# Patient Record
Sex: FEMALE | Race: WHITE | NOT HISPANIC OR LATINO | Employment: OTHER | URBAN - METROPOLITAN AREA
[De-identification: names, ages, dates, MRNs, and addresses within clinical notes are randomized per-mention and may not be internally consistent; named-entity substitution may affect disease eponyms.]

---

## 2019-10-05 ENCOUNTER — HOSPITAL ENCOUNTER (EMERGENCY)
Facility: HOSPITAL | Age: 44
Discharge: HOME | End: 2019-10-05
Attending: EMERGENCY MEDICINE
Payer: COMMERCIAL

## 2019-10-05 VITALS
HEART RATE: 115 BPM | SYSTOLIC BLOOD PRESSURE: 131 MMHG | RESPIRATION RATE: 18 BRPM | OXYGEN SATURATION: 98 % | DIASTOLIC BLOOD PRESSURE: 83 MMHG | TEMPERATURE: 98.7 F | BODY MASS INDEX: 43.43 KG/M2 | HEIGHT: 64 IN

## 2019-10-05 DIAGNOSIS — S91.209A AVULSION OF TOENAIL OF RIGHT FOOT: Primary | ICD-10-CM

## 2019-10-05 PROCEDURE — 99283 EMERGENCY DEPT VISIT LOW MDM: CPT

## 2019-10-05 PROCEDURE — 99284 EMERGENCY DEPT VISIT MOD MDM: CPT | Performed by: PHYSICIAN ASSISTANT

## 2019-10-05 RX ORDER — NAPROXEN 250 MG/1
250 TABLET ORAL 2 TIMES DAILY WITH MEALS
COMMUNITY

## 2019-10-05 RX ADMIN — Medication 1 APPLICATION: at 13:59

## 2019-10-05 NOTE — ED PROVIDER NOTES
History  Chief Complaint   Patient presents with    Toe Pain     pt presents to ed with left big toe nail injury that happened this am  -falls, -head injury, -bt     57-year-old female with no significant past medical history presents to the emergency department with chief complaint of right great toe injury  Onset of symptoms reported as this morning  Location of symptoms reported as the right great toe  Quality is reported as nail avulsion  Severity is reported as moderate  Associated symptoms:  Denies paralysis paresthesias or weakness to the right lower extremity  Denies right foot pain  Denies right ankle pain  Denies swelling or rash  Modifying factors:  Nothing has been tried for treatment of symptoms  Context:  Patient reports she was at home when she accidentally caught her nail on a piece of furniture that resulted in the nail being pulled up from the right great toe  She denies other injuries  She did not injure the actual toe she reports she only cough and nail which is what caused it to be pulled off of the toe  Denies other injuries  Tetanus is up-to-date  Reviewed past visits via epic:  No prior visits to this emergency department  History provided by:  Parent and significant other   used: No        Prior to Admission Medications   Prescriptions Last Dose Informant Patient Reported? Taking?   naproxen (NAPROSYN) 250 mg tablet Past Week at Unknown time  Yes Yes   Sig: Take 250 mg by mouth 2 (two) times a day with meals      Facility-Administered Medications: None       History reviewed  No pertinent past medical history  Past Surgical History:   Procedure Laterality Date    BLADDER IRRIGATION         History reviewed  No pertinent family history  I have reviewed and agree with the history as documented  Social History     Tobacco Use    Smoking status: Never Smoker    Smokeless tobacco: Never Used   Substance Use Topics    Alcohol use:  Yes Comment: social    Drug use: Not on file        Review of Systems   Constitutional: Negative for activity change, appetite change, chills, diaphoresis, fatigue, fever and unexpected weight change  HENT: Negative for congestion, dental problem, drooling, ear discharge, ear pain, facial swelling, hearing loss, mouth sores, nosebleeds, postnasal drip, rhinorrhea, sinus pressure, sinus pain, sneezing, sore throat, tinnitus, trouble swallowing and voice change  Eyes: Negative for photophobia, pain, discharge, redness, itching and visual disturbance  Respiratory: Negative for apnea, cough, choking, chest tightness, shortness of breath, wheezing and stridor  Cardiovascular: Negative for chest pain, palpitations and leg swelling  Gastrointestinal: Negative for abdominal distention, abdominal pain, anal bleeding, blood in stool, constipation, diarrhea, nausea, rectal pain and vomiting  Endocrine: Negative for cold intolerance, heat intolerance, polydipsia, polyphagia and polyuria  Genitourinary: Negative for decreased urine volume, difficulty urinating, dyspareunia, dysuria, enuresis, flank pain, frequency, hematuria, menstrual problem, pelvic pain, urgency, vaginal bleeding, vaginal discharge and vaginal pain  Musculoskeletal: Negative for arthralgias, back pain, gait problem, joint swelling, myalgias, neck pain and neck stiffness  Skin: Positive for wound  Negative for color change, pallor and rash  Allergic/Immunologic: Negative for environmental allergies, food allergies and immunocompromised state  Neurological: Negative for dizziness, tremors, seizures, syncope, facial asymmetry, speech difficulty, weakness, light-headedness, numbness and headaches  Hematological: Negative for adenopathy  Does not bruise/bleed easily  Psychiatric/Behavioral: Negative for agitation, confusion, hallucinations, self-injury and suicidal ideas  The patient is not hyperactive      All other systems reviewed and are negative  Physical Exam  Physical Exam   Constitutional: She is oriented to person, place, and time  She appears well-developed and well-nourished  No distress  /83   Pulse (!) 115   Temp 98 7 °F (37 1 °C) (Oral)   Resp 18   Ht 5' 4" (1 626 m)   LMP  (LMP Unknown)   SpO2 98%   BMI 43 43 kg/m²    HENT:   Head: Normocephalic and atraumatic  Right Ear: External ear normal    Left Ear: External ear normal    Nose: Nose normal    Mouth/Throat: Oropharynx is clear and moist  No oropharyngeal exudate  Eyes: Pupils are equal, round, and reactive to light  Conjunctivae and EOM are normal  Right eye exhibits no discharge  Left eye exhibits no discharge  No scleral icterus  Neck: Normal range of motion  Neck supple  No JVD present  No tracheal deviation present  Cardiovascular: Normal rate, regular rhythm and intact distal pulses  Pulmonary/Chest: Effort normal and breath sounds normal  No stridor  No respiratory distress  She has no wheezes  She has no rales  She exhibits no tenderness  Abdominal: Soft  Bowel sounds are normal  She exhibits no distension and no mass  There is no tenderness  There is no rebound and no guarding  No hernia  Musculoskeletal: Normal range of motion  She exhibits tenderness  She exhibits no edema or deformity  Right Foot/Ankle exam: abnormal inspection, there is full great toenail avulsion, no gross deformity, SILT to all surfaces, NVI, cap refill less than 3 seconds  Posterior tibial pulse 2/4 normal   Distal foot is normal to inspection, nontender to palpation with FROM  Proximal knee is normal to inspection, nontender to palpation with FROM  Proximal fibula is nontender to palpation  Ankle is nontender to palpation to all surfaces of right ankle     ROM of ankle is intact to flexion, extension  No calcaneus or 5th metatarsal tenderness to palpation  The great toe is non tender to palpation to all surfaces    There is complete avulsion of the great toenail which is still present  No laceration to underlying nailbed  Melendezs Test: squeeze of posterior calf produces plantar flexion of foot  No Achilles tendon tenderness to palpation  Lymphadenopathy:     She has no cervical adenopathy  Neurological: She is alert and oriented to person, place, and time  She displays normal reflexes  No cranial nerve deficit or sensory deficit  She exhibits normal muscle tone  Coordination normal    Skin: Skin is warm and dry  Capillary refill takes less than 2 seconds  No rash noted  She is not diaphoretic  No erythema  No pallor  Psychiatric: She has a normal mood and affect  Her behavior is normal  Judgment and thought content normal    Nursing note and vitals reviewed  Vital Signs  ED Triage Vitals [10/05/19 1149]   Temperature Pulse Respirations Blood Pressure SpO2   98 7 °F (37 1 °C) (!) 115 18 131/83 98 %      Temp Source Heart Rate Source Patient Position - Orthostatic VS BP Location FiO2 (%)   Oral Monitor -- -- --      Pain Score       7           Vitals:    10/05/19 1149   BP: 131/83   Pulse: (!) 115         Visual Acuity      ED Medications  Medications   LET gel 1 application (1 application Topical Given 10/5/19 1359)       Diagnostic Studies  Results Reviewed     None                 No orders to display              Procedures  Procedures       ED Course                               MDM  Number of Diagnoses or Management Options  Avulsion of toenail of right foot: new and does not require workup  Diagnosis management comments: ddx includes but is not limited to nail avulsion, nailbed injury, infection, laceration, toe injury  Patient with no laceration to toe on exploration  Nailbed without laceration on exam   Wound explored to depth  Discussed with patient, native nail is best nailbed cover  Let applied to cuticle to assist with pain control    Nail was manually reduced and secured in place with steristrips, xeroform dressing, and gauze wrap  Patient tolerated replacement of nail well  Discussed care of replaced nail with patient  Discussed expected course of nail injury including the fact that the nail will likely appear deformed as it starts to grow out  No sign of infection  No indication for antibiotic therapy  Discussed rest, elevation and limit weight bearing on toe for the next few days  Discussed maintain dressing to toe and wear toes with wide toebox  Discussed all test results with patient, including abnormal results  Discussed with patient symptoms most consistent with: right great toenail avulsion  Discussed treatment plan and expected course with patient including: replacement of nail, continue dressing, limited weight bearing  Discussed follow-up with primary care physician in 3-5 days for recheck and further evaluation of symptoms  Discussed follow up with specialist including podiatry in 3-5 days for further evaluation of symptoms  Reviewed reasons to return to ed  Patient verbalized understanding of diagnosis and agreement with discharge plan of care as well as understanding of reasons to return to ed  Amount and/or Complexity of Data Reviewed  Obtain history from someone other than the patient: yes (spouse)  Review and summarize past medical records: yes    Patient Progress  Patient progress: stable      Disposition  Final diagnoses:   Avulsion of toenail of right foot     Time reflects when diagnosis was documented in both MDM as applicable and the Disposition within this note     Time User Action Codes Description Comment    10/5/2019  2:54 PM Winifred Johnson Add [[id number]] Avulsion of toenail of right foot       ED Disposition     ED Disposition Condition Date/Time Comment    Discharge Stable Sat Oct 5, 2019  2:54 PM Celina Damian discharge to home/self care              Follow-up Information     Follow up With Specialties Details Why Contact Info Additional Information    Gold Chou MD Internal Medicine, Palliative Care Call in 1 day for further evaluation of symptoms [address] [place], [address] [facility] [place] [address] Emergency Department Emergency Medicine Go to  If symptoms worsen [address] [facility] [address] ED, [address] [place], [place], [facility], [facility] Podiatry Call in 1 day for further evaluation of symptoms [address]  [place] [address]  [phone]             Discharge Medication List as of 10/5/2019  2:57 PM      CONTINUE these medications which have NOT CHANGED    Details   naproxen (NAPROSYN) 250 mg tablet Take 250 mg by mouth 2 (two) times a day with meals, Historical Med           No discharge procedures on file      ED Provider  Electronically Signed by           Keyana Shin PA-C  10/05/19 0932

## 2019-10-05 NOTE — DISCHARGE INSTRUCTIONS
Treat with dressing as demonstrated in emergency department - steri strip to nail, followed by xeroform dressing (yellow) followed by gauze wrap (white)  Follow up with podiatry in 3-5 days for recheck and further treatment  Rest, ice, elevate foot

## 2023-04-24 ENCOUNTER — OFFICE VISIT (OUTPATIENT)
Dept: PHYSICAL THERAPY | Facility: CLINIC | Age: 48
End: 2023-04-24

## 2023-04-24 DIAGNOSIS — M25.561 RIGHT KNEE PAIN, UNSPECIFIED CHRONICITY: ICD-10-CM

## 2023-04-24 DIAGNOSIS — M25.562 LEFT KNEE PAIN, UNSPECIFIED CHRONICITY: Primary | ICD-10-CM

## 2023-04-26 ENCOUNTER — APPOINTMENT (OUTPATIENT)
Dept: PHYSICAL THERAPY | Facility: CLINIC | Age: 48
End: 2023-04-26

## 2023-05-01 ENCOUNTER — OFFICE VISIT (OUTPATIENT)
Dept: PHYSICAL THERAPY | Facility: CLINIC | Age: 48
End: 2023-05-01

## 2023-05-01 DIAGNOSIS — M25.561 RIGHT KNEE PAIN, UNSPECIFIED CHRONICITY: ICD-10-CM

## 2023-05-01 DIAGNOSIS — M25.562 LEFT KNEE PAIN, UNSPECIFIED CHRONICITY: Primary | ICD-10-CM

## 2023-05-01 NOTE — PROGRESS NOTES
"Daily Note     Today's date: 2023  Patient name: Santosh Gray  : 1975  MRN: [id number]  Referring provider: Matthew Kapadia MD  Dx:   Encounter Diagnosis     ICD-10-CM    1  Left knee pain, unspecified chronicity  M25 562       2  Right knee pain, unspecified chronicity  M25 561                      Subjective: Pt reports that she seems to be able to tolerate longer periods of standing/walking since starting skilled PT tx  However, she reports still having a large amount of pain w/ initial transition from sit --> stand and ambulation after sitting for extended periods of time  Objective: See treatment diary below      Assessment: Progressed ex this visit as below  Pt initially w/ difficulty properly coordinating lateral step ups- improved technique w/ decrease in step height and cueing to drive through heel with step ups  Remainder of ex tolerated well  Plan: Continue per plan of care        Daily Treatment Diary    EPOC: 23  Precautions: Recently dx'd w/ undifferentiated connective tissue disorder  Co- Morbidities: Recently dx'd w/ undifferentiated connective tissue disorder      Manual                B/L knee/patellar PROM    RB  RB  SK RB                                                                                                              Exercise Diary   5         THEREX           Pt ed 8' HEP instruct/handout           Recumbent Bike    5' 5'  5' 6'         Gastrocs Stretch    30\"x3 ea b/l   30\"x3 ea b/l  30\"x3 ea b/l   30\"x3 ea b/l          HR/TR   15x/15x  15x/15x 15x/15x  15x/15x         Active HS stretch w/ ankle pumps  10 pumps x 2 ea b/l  10 pumps x2 ea b/l  10 pumps x2 ea b/l       Heel Slides  10\"x10 ea b/l  10\"x10 ea b/l  10\"x10 ea b/l                              NEURO RE-ED           Quad sets w/ towel roll under knee    10\"x10 ea b/l  10\"x10 ea b/l  10\"x10 ea b/l           SLR flex/abd   Flex: 2x15 b/l; Abd: R " "2x15 and L 15x    Flex 2x15 ea           Minisquats                   Lat step ups     4\"x10 ea b/l       TB resisted hip strengthening     YTB 10x ea b/l flex, abd      TB resisted sidestepping                      THERAPEUTIC ACTIVITY           Step ups           STS  10x  10x 15x                                                 Gait Training                                                           Modalities 4/12 4/17 4/24  5/1             CP PRN Home PRB  10'  10'  10'                                                                     "

## 2023-05-03 ENCOUNTER — OFFICE VISIT (OUTPATIENT)
Dept: PHYSICAL THERAPY | Facility: CLINIC | Age: 48
End: 2023-05-03

## 2023-05-03 DIAGNOSIS — M25.562 LEFT KNEE PAIN, UNSPECIFIED CHRONICITY: Primary | ICD-10-CM

## 2023-05-03 DIAGNOSIS — M25.561 RIGHT KNEE PAIN, UNSPECIFIED CHRONICITY: ICD-10-CM

## 2023-05-03 NOTE — PROGRESS NOTES
"Daily Note     Today's date: 5/3/2023  Patient name: Army Peñaloza  : 1975  MRN: [id number]  Referring provider: Juan Machado MD  Dx:   Encounter Diagnosis     ICD-10-CM    1  Left knee pain, unspecified chronicity  M25 562       2  Right knee pain, unspecified chronicity  M25 561                      Subjective: Pt reports that her knees are doing ok today      Objective: See treatment diary below      Assessment: Progressed ex this visit to include TB resisted sidestepping and fwd step ups  Pt tolerated well- increased cueing required for proper technique w/ step ups to drive heel into step to engage glutes but improved w/ practice  Plan: Continue per plan of care        Daily Treatment Diary    EPOC: 23  Precautions: Recently dx'd w/ undifferentiated connective tissue disorder  Co- Morbidities: Recently dx'd w/ undifferentiated connective tissue disorder      Manual     5/3           B/L knee/patellar PROM    RB  RB  SK RB  RB           k-tape patellar support           RB                                                                                     Exercise Diary  53       THEREX           Pt ed 8' HEP instruct/handout           Recumbent Bike    5' 5'  5' 6' 6'        Gastrocs Stretch    30\"x3 ea b/l   30\"x3 ea b/l  30\"x3 ea b/l   30\"x3 ea b/l   30\"x3 ea b/l        HR/TR   15x/15x  15x/15x 15x/15x  15x/15x  15x/15x       Active HS stretch w/ ankle pumps  10 pumps x 2 ea b/l  10 pumps x2 ea b/l  10 pumps x2 ea b/l  10 pumps x 3 ea b/l      Heel Slides  10\"x10 ea b/l  10\"x10 ea b/l  10\"x10 ea b/l                              NEURO RE-ED           Quad sets w/ towel roll under knee    10\"x10 ea b/l  10\"x10 ea b/l  10\"x10 ea b/l           SLR flex/abd   Flex: 2x15 b/l; Abd: R 2x15 and L 15x    Flex 2x15 ea           Minisquats                   Lat step ups     4\"x10 ea b/l  4\"x15 ea b/l      TB resisted hip strengthening     YTB " "10x ea b/l flex, abd YTB 15x ea b/l flex/abd     TB resisted sidestepping      YTB 2 laps @ bar ea dir b/l                 THERAPEUTIC ACTIVITY           Step ups      4\" x 10 ea b/l w/ drive through heel     STS  10x  10x 15x                                                 Gait Training                                                           Modalities 4/12 4/17 4/24 5/1 5/3            CP PRN Home PRB  10'  10'  10' 10'                                                                   "

## 2023-05-08 ENCOUNTER — OFFICE VISIT (OUTPATIENT)
Dept: PHYSICAL THERAPY | Facility: CLINIC | Age: 48
End: 2023-05-08

## 2023-05-08 DIAGNOSIS — M25.561 RIGHT KNEE PAIN, UNSPECIFIED CHRONICITY: ICD-10-CM

## 2023-05-08 DIAGNOSIS — M25.562 LEFT KNEE PAIN, UNSPECIFIED CHRONICITY: Primary | ICD-10-CM

## 2023-05-08 NOTE — PROGRESS NOTES
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 w/o much increase in pain  She adds that the k-tape seemed to help improve symptoms  Objective: See treatment diary below      Assessment: Pt w/ much better technique/form today w/ step ups and required less cueing to perform properly  Although less cueing, pt is more challenged w/ LLE stepping up  Progressed STS for pt to include 5# weight this visit  Overall tx tolerated well  Plan: Continue per plan of care  Progress as able NV       Daily Treatment Diary    EPOC: 23  Precautions: Recently dx'd w/ undifferentiated connective tissue disorder  Co- Morbidities: Recently dx'd w/ undifferentiated connective tissue disorder      Manual  4/12  4/17  4/19  4/24 5/1   5/3  5/8         B/L knee/patellar PROM    RB  RB  SK RB  RB  RB         k-tape patellar support           RB  RB                                                                                   Exercise Diary 4/12  4//17  4/19  4/24  5/1 5/3  5/8     THEREX           Pt ed 8' HEP instruct/handout           Recumbent Bike    5' 5'  5' 6' 6'   6'     Gastrocs Stretch    30\"x3 ea b/l   30\"x3 ea b/l  30\"x3 ea b/l   30\"x3 ea b/l   30\"x3 ea b/l   30\"x3 ea b/l      HR/TR   15x/15x  15x/15x 15x/15x  15x/15x  15x/15x Held- foot pain     Active HS stretch w/ ankle pumps  10 pumps x 2 ea b/l  10 pumps x2 ea b/l  10 pumps x2 ea b/l  10 pumps x 3 ea b/l      Heel Slides  10\"x10 ea b/l  10\"x10 ea b/l  10\"x10 ea b/l                              NEURO RE-ED           Quad sets w/ towel roll under knee    10\"x10 ea b/l  10\"x10 ea b/l  10\"x10 ea " "b/l           SLR flex/abd   Flex: 2x15 b/l; Abd: R 2x15 and L 15x    Flex 2x15 ea           Minisquats                   Lat step ups     4\"x10 ea b/l  4\"x15 ea b/l  4\" x 15x ea b/l     TB resisted hip strengthening     YTB 10x ea b/l flex, abd YTB 15x ea b/l flex/abd YTB 15x ea b/l flex/abd/ext    TB resisted sidestepping      YTB 2 laps @ bar ea dir b/l  YTB 2 laps @ bar ea dir b/l                THERAPEUTIC ACTIVITY           Step ups      4\" x 10 ea b/l w/ drive through heel 4\" x10 ea b/l w/ drive through heel    STS  10x  10x 15x  5# 15x    Box lifts        NV                                    Gait Training                                                           Modalities 4/12 4/17 4/24 5/1 5/3  5/8         CP PRN Home PRB  10'  10'  10' 10'  10'                                                                 "

## 2023-05-10 ENCOUNTER — OFFICE VISIT (OUTPATIENT)
Dept: PHYSICAL THERAPY | Facility: CLINIC | Age: 48
End: 2023-05-10

## 2023-05-10 DIAGNOSIS — M25.562 LEFT KNEE PAIN, UNSPECIFIED CHRONICITY: ICD-10-CM

## 2023-05-10 DIAGNOSIS — M25.561 RIGHT KNEE PAIN, UNSPECIFIED CHRONICITY: Primary | ICD-10-CM

## 2023-05-10 NOTE — PROGRESS NOTES
"Daily Note     Today's date: 5/10/2023  Patient name: Kvng Laurent  : 1975  MRN: [id number]  Referring provider: Marcela Carmona MD  Dx:   Encounter Diagnosis     ICD-10-CM    1  Right knee pain, unspecified chronicity  M25 561       2  Left knee pain, unspecified chronicity  M25 562           Start Time: 0800          Subjective: pt reports her knee feel tight today  Objective: See treatment diary below      Assessment: Tolerated treatment well  Patient would benefit from continued PT  Started to work on eccentric step down w/ good tolerance, cueing for proper form and c/o pain in L knee  Plan: Continue per plan of care        Daily Treatment Diary    EPOC: 23  Precautions: Recently dx'd w/ undifferentiated connective tissue disorder  Co- Morbidities: Recently dx'd w/ undifferentiated connective tissue disorder      Manual  4/12  4/17  4/19  4/24 5/1   5/3  5/8  5/10       B/L knee/patellar PROM    RB  RB  SK RB  RB  RB  JH       k-tape patellar support           RB  RB                                                                                   Exercise Diary 4/12  4//17  4/19  4/24  5/1 5/3  5/8  5/10   THEREX           Pt ed 8' HEP instruct/handout           Recumbent Bike    5' 5'  5' 6' 6'   6'  6'   Gastrocs Stretch    30\"x3 ea b/l   30\"x3 ea b/l  30\"x3 ea b/l   30\"x3 ea b/l   30\"x3 ea b/l   30\"x3 ea b/l   30\"x3 ea b/l    HR/TR   15x/15x  15x/15x 15x/15x  15x/15x  15x/15x Held- foot pain     Active HS stretch w/ ankle pumps  10 pumps x 2 ea b/l  10 pumps x2 ea b/l  10 pumps x2 ea b/l  10 pumps x 3 ea b/l      Heel Slides  10\"x10 ea b/l  10\"x10 ea b/l  10\"x10 ea b/l                              NEURO RE-ED           Quad sets w/ towel roll under knee    10\"x10 ea b/l  10\"x10 ea b/l  10\"x10 ea b/l           SLR flex/abd   Flex: 2x15 b/l; Abd: R 2x15 and L 15x    Flex 2x15 ea           Minisquats                   Lat step ups     4\"x10 ea b/l  4\"x15 ea b/l  4\" x 15x ea b/l  4\" x " "15x ea b/l    TB resisted hip strengthening     YTB 10x ea b/l flex, abd YTB 15x ea b/l flex/abd YTB 15x ea b/l flex/abd/ext YTB 15x ea b/l flex/abd/ext   TB resisted sidestepping      YTB 2 laps @ bar ea dir b/l  YTB 2 laps @ bar ea dir b/l  YTB 2 laps @ bar ea dir b/l   Step downs        4\" 10x   THERAPEUTIC ACTIVITY           Step ups      4\" x 10 ea b/l w/ drive through heel 4\" x10 ea b/l w/ drive through heel 6\" x10 b/l w/ drive through heel    STS  10x  10x 15x  5# 15x 5# 15x   Box lifts        NV                                    Gait Training                                                           Modalities 4/12 4/17 4/24 5/1 5/3  5/8         CP PRN Home PRB  10'  10'  10' 10'  10'                                                                   "

## 2023-05-17 ENCOUNTER — OFFICE VISIT (OUTPATIENT)
Dept: PHYSICAL THERAPY | Facility: CLINIC | Age: 48
End: 2023-05-17

## 2023-05-17 DIAGNOSIS — M25.561 RIGHT KNEE PAIN, UNSPECIFIED CHRONICITY: Primary | ICD-10-CM

## 2023-05-17 DIAGNOSIS — M25.562 LEFT KNEE PAIN, UNSPECIFIED CHRONICITY: ICD-10-CM

## 2023-05-17 NOTE — PROGRESS NOTES
"Daily Note     Today's date: 2023  Patient name: Tarun Hackett  : 1975  MRN: [id number]  Referring provider: Mikayla Solitario MD  Dx:   Encounter Diagnosis     ICD-10-CM    1  Right knee pain, unspecified chronicity  M25 561       2  Left knee pain, unspecified chronicity  M25 562                      Subjective: Pt reports that her knees were more sore yesterday due to needing to \"sit quite a bit\" for work  She reports trying to take standing breaks  However, overall pt reports that walking around/standing has been significantly improved regarding knees  She is in more pain w/ b/l feet today  Objective: See treatment diary below      Assessment: Held step ups/downs this visit due to pt feeling more pain in feet today  However, added box lifts from low mat and floor today- pt able to perform w/ correct form after initial cueing and practice  No increased pain w/ box lifts  Remainder of ex tolerated well  Plan: Continue per plan of care         Daily Treatment Diary    EPOC: 23  Precautions: Recently dx'd w/ undifferentiated connective tissue disorder  Co- Morbidities: Recently dx'd w/ undifferentiated connective tissue disorder      Manual  4/12  4/17  4/19  4/24 5/1   5/3  5/8  5/10  5/17     B/L knee/patellar PROM    RB  RB  SK RB  RB  RB  JH  RB     k-tape patellar support           RB  RB    RB                                                                               Exercise Diary  4/24  5/1 5/3  5/8  5/10 5/17      THEREX            Pt ed             Recumbent Bike  5' 6' 6'   6'  6' 6'      Gastrocs Stretch 30\"x3 ea b/l   30\"x3 ea b/l   30\"x3 ea b/l   30\"x3 ea b/l   30\"x3 ea b/l  30\"x3 ea b/l       HR/TR 15x/15x  15x/15x  15x/15x Held- foot pain         Active HS stretch w/ ankle pumps 10 pumps x2 ea b/l  10 pumps x 3 ea b/l    10 pumps x 3 ea b/l       Heel Slides 10\"x10 ea b/l                                    NEURO RE-ED            Quad sets w/ towel roll under knee 10\"x10 " "ea b/l               SLR flex/abd  Flex 2x15 ea               Minisquats                 Lat step ups  4\"x10 ea b/l  4\"x15 ea b/l  4\" x 15x ea b/l  4\" x 15x ea b/l        TB resisted hip strengthening  YTB 10x ea b/l flex, abd YTB 15x ea b/l flex/abd YTB 15x ea b/l flex/abd/ext YTB 15x ea b/l flex/abd/ext YTB 15x ea b/l flex/abd/ext      TB resisted sidestepping   YTB 2 laps @ bar ea dir b/l  YTB 2 laps @ bar ea dir b/l  YTB 2 laps @ bar ea dir b/l YTB 2 laps @ bar ea dir b/l       Step downs     4\" 10x       THERAPEUTIC ACTIVITY            Step ups   4\" x 10 ea b/l w/ drive through heel 4\" x10 ea b/l w/ drive through heel 6\" x10 b/l w/ drive through heel        STS 10x 15x  5# 15x 5# 15x 10# 15x      Box lifts     NV  16# 5x from low mat; 10 x from floor                                     Gait Training                                                     Modalities 4/12  4/17  4/24  5/1 5/3  5/8  5/17       CP PRN Home PRB  10'  10'  10' 10'  10'  10'                                                                 "

## 2023-05-19 ENCOUNTER — OFFICE VISIT (OUTPATIENT)
Dept: PHYSICAL THERAPY | Facility: CLINIC | Age: 48
End: 2023-05-19

## 2023-05-19 DIAGNOSIS — M25.561 RIGHT KNEE PAIN, UNSPECIFIED CHRONICITY: Primary | ICD-10-CM

## 2023-05-19 DIAGNOSIS — M25.562 LEFT KNEE PAIN, UNSPECIFIED CHRONICITY: ICD-10-CM

## 2023-05-19 NOTE — PROGRESS NOTES
"Daily Note     Today's date: 2023  Patient name: Raghavendra Parker  : 1975  MRN: [id number]  Referring provider: Jessica Larios MD  Dx:   Encounter Diagnosis     ICD-10-CM    1  Right knee pain, unspecified chronicity  M25 561       2  Left knee pain, unspecified chronicity  M25 562           Start Time: 0850          Subjective: pt reports standing and walking is improving  Stated that negotiating stairs is still problematic  Objective: See treatment diary below      Assessment: Tolerated treatment well  Patient would benefit from continued PT  Taped knee to help support patella more  Focused mostly on functional lifting/carrying w/ good tolerance  Cueing for proper lifting techniques  Plan: Continue per plan of care        Daily Treatment Diary    EPOC: 23  Precautions: Recently dx'd w/ undifferentiated connective tissue disorder  Co- Morbidities: Recently dx'd w/ undifferentiated connective tissue disorder      Manual  4/12  4/17  4/19  4/24 5/1   5/3  5/8  5/10  5/17  5/19   B/L knee/patellar PROM    RB  RB  SK RB  RB  RB  [facility]   k-tape patellar support           RB  RB    [facility]                                                                             Exercise Diary   5/3  5/8  5/10 5/17 5/19     THEREX            Pt ed             Recumbent Bike  5' 6' 6'   6'  6' 6' 6'     Gastrocs Stretch 30\"x3 ea b/l   30\"x3 ea b/l   30\"x3 ea b/l   30\"x3 ea b/l   30\"x3 ea b/l  30\"x3 ea b/l  30\"x3 ea b/l      HR/TR 15x/15x  15x/15x  15x/15x Held- foot pain         Active HS stretch w/ ankle pumps 10 pumps x2 ea b/l  10 pumps x 3 ea b/l    10 pumps x 3 ea b/l       Heel Slides 10\"x10 ea b/l                                    NEURO RE-ED            Quad sets w/ towel roll under knee 10\"x10 ea b/l               SLR flex/abd  Flex 2x15 ea               Minisquats                 Lat step ups  4\"x10 ea b/l  4\"x15 ea b/l  4\" x 15x ea b/l  4\" x 15x ea b/l        TB resisted hip " "strengthening  YTB 10x ea b/l flex, abd YTB 15x ea b/l flex/abd YTB 15x ea b/l flex/abd/ext YTB 15x ea b/l flex/abd/ext YTB 15x ea b/l flex/abd/ext      TB resisted sidestepping   YTB 2 laps @ bar ea dir b/l  YTB 2 laps @ bar ea dir b/l  YTB 2 laps @ bar ea dir b/l YTB 2 laps @ bar ea dir b/l       Step downs     4\" 10x       THERAPEUTIC ACTIVITY            Step ups   4\" x 10 ea b/l w/ drive through heel 4\" x10 ea b/l w/ drive through heel 6\" x10 b/l w/ drive through heel        STS 10x 15x  5# 15x 5# 15x 10# 15x 10# 20x     Box lifts     NV  16# 5x from low mat; 10 x from floor 16# 5x from low mat; 10 x from floor     Box carry       3 laps      TRX squats to 12\" box            12\" + 4\" 10x      Gait Training                                                     Modalities 4/12 4/17 4/24 5/1 5/3  5/8  5/17       CP PRN Home PRB  10'  10'  10' 10'  10'  10'                                                                   "

## 2023-05-22 ENCOUNTER — OFFICE VISIT (OUTPATIENT)
Dept: PHYSICAL THERAPY | Facility: CLINIC | Age: 48
End: 2023-05-22

## 2023-05-22 DIAGNOSIS — M25.561 RIGHT KNEE PAIN, UNSPECIFIED CHRONICITY: Primary | ICD-10-CM

## 2023-05-22 DIAGNOSIS — M25.562 LEFT KNEE PAIN, UNSPECIFIED CHRONICITY: ICD-10-CM

## 2023-05-22 NOTE — PROGRESS NOTES
"Daily Note     Today's date: 2023  Patient name: Nilda Pond  : 1975  MRN: [id number]  Referring provider: Jose Lock MD  Dx:   Encounter Diagnosis     ICD-10-CM    1  Right knee pain, unspecified chronicity  M25 561       2  Left knee pain, unspecified chronicity  M25 562                      Subjective: pt reports having some pain camping especially when stepping over uneven terrain, but states that pain was not severe and did not prevent her from doing what she needed that day and the following day  Objective: See treatment diary below      Assessment: Pt w/ some difficulty engaging L quadriceps musculature w/ step downs/taps initially, but seemed to better engage when performing TRX squat w/ WS to L and L mini lunge  Remainder of ex tolerated fairly well  Improved pain level and tissue tone in L after TPR to distal ITB  Plan: Continue per plan of care  Progress as able NV        Daily Treatment Diary    EPOC: 23  Precautions: Recently dx'd w/ undifferentiated connective tissue disorder  Co- Morbidities: Recently dx'd w/ undifferentiated connective tissue disorder      Manual  4/24 5/1   5/3  5/8  5/10  5/17  5/19 5/22     B/L knee/patellar PROM  SK RB  RB  RB  JH  RB  JH RB     k-tape patellar support     RB  RB    RB  JH       TPR to distal ITB-L               RB                                                 Exercise Diary  4/24  5/1 5/3  5/8  5/10 5/17 5/19 5/22    THEREX            Pt ed             Recumbent Bike  5' 6' 6'   6'  6' 6' 6' 6'    Gastrocs Stretch 30\"x3 ea b/l   30\"x3 ea b/l   30\"x3 ea b/l   30\"x3 ea b/l   30\"x3 ea b/l  30\"x3 ea b/l  30\"x3 ea b/l  30\"x3 ea b/l     HR/TR 15x/15x  15x/15x  15x/15x Held- foot pain         Active HS stretch w/ ankle pumps 10 pumps x2 ea b/l  10 pumps x 3 ea b/l    10 pumps x 3 ea b/l   10 pumps x 3 ea b/l     Heel Slides 10\"x10 ea b/l                                    NEURO RE-ED            Quad sets w/ towel roll under knee " "10\"x10 ea b/l               SLR flex/abd  Flex 2x15 ea               Minisquats             TRX w/ shift to L 10\"x10    Mini Lunge        5\" x10 ea b/l     Lat step ups  4\"x10 ea b/l  4\"x15 ea b/l  4\" x 15x ea b/l  4\" x 15x ea b/l    R 6\" 15x/L 4\" 15x    TB resisted hip strengthening  YTB 10x ea b/l flex, abd YTB 15x ea b/l flex/abd YTB 15x ea b/l flex/abd/ext YTB 15x ea b/l flex/abd/ext YTB 15x ea b/l flex/abd/ext  YTB 15x ea bl/ flex/abd/ext    TB resisted sidestepping   YTB 2 laps @ bar ea dir b/l  YTB 2 laps @ bar ea dir b/l  YTB 2 laps @ bar ea dir b/l YTB 2 laps @ bar ea dir b/l   YTB 2 laps @ bar ea dir b/l     Step downs     4\" 10x   4\" *pain; also attemtped L taps *pain    THERAPEUTIC ACTIVITY            Step ups   4\" x 10 ea b/l w/ drive through heel 4\" x10 ea b/l w/ drive through heel 6\" x10 b/l w/ drive through heel        STS 10x 15x  5# 15x 5# 15x 10# 15x 10# 20x     Box lifts     NV  16# 5x from low mat; 10 x from floor 16# 5x from low mat; 10 x from floor     Box carry       3 laps      TRX squats to 12\" box            12\" + 4\" 10x      Gait Training                                                     Modalities 4/12 4/17 4/24 5/1 5/3  5/8 5/17       CP PRN Home PRB  10'  10'  10' 10'  10'  10'                                                                   "

## 2023-05-24 ENCOUNTER — OFFICE VISIT (OUTPATIENT)
Dept: PHYSICAL THERAPY | Facility: CLINIC | Age: 48
End: 2023-05-24

## 2023-05-24 DIAGNOSIS — M25.561 RIGHT KNEE PAIN, UNSPECIFIED CHRONICITY: Primary | ICD-10-CM

## 2023-05-24 DIAGNOSIS — M25.562 LEFT KNEE PAIN, UNSPECIFIED CHRONICITY: ICD-10-CM

## 2023-05-24 NOTE — PROGRESS NOTES
"Daily Note     Today's date: 2023  Patient name: Loki Jha  : 1975  MRN: [id number]  Referring provider: Lynn Calvillo MD  Dx:   Encounter Diagnosis     ICD-10-CM    1  Right knee pain, unspecified chronicity  M25 561       2  Left knee pain, unspecified chronicity  M25 562                      Subjective: pt states that L lateral knee has been more painful lately  - states that she has had some pain sleeping  Discussed attempting to sleep w/ pillow support b/w knees  Objective: See treatment diary below      Assessment: Performed manuals first f/b stretching, but pt still w/ pain during LLE step ups  Plan: Continue per plan of care    Concluded w/ CP w/ good tolerance     Daily Treatment Diary    EPOC: 23  Precautions: Recently dx'd w/ undifferentiated connective tissue disorder  Co- Morbidities: Recently dx'd w/ undifferentiated connective tissue disorder      Manual  5/8  5/10  5/17  5/19 5/22 5/24    B/L knee/patellar PROM  RB  JH  RB  JH RB RB    k-tape patellar support  RB    RB  JH       TPR to distal ITB-L         RB RB                                    Exercise Diary  5/8  5/10 5/17 5/19 5/22 5/24   THEREX         Pt ed         Recumbent Bike  6'  6' 6' 6' 6' 6'   Gastrocs Stretch  30\"x3 ea b/l   30\"x3 ea b/l  30\"x3 ea b/l  30\"x3 ea b/l  30\"x3 ea b/l     HR/TR Held- foot pain         Active HS stretch w/ ankle pumps   10 pumps x 3 ea b/l   10 pumps x 3 ea b/l  10 pumpsx3 ea b/l    Heel Slides         Supine ITB stretch      30\"x3 ea b/l ; 30\"x3 L off table   Standing ITB stretch      30\"x3   NEURO RE-ED         Quad sets w/ towel roll under knee           SLR flex/abd           Minisquats       TRX w/ shift to L 10\"x10    Mini Lunge     5\" x10 ea b/l     Lat step ups 4\" x 15x ea b/l  4\" x 15x ea b/l    R 6\" 15x/L 4\" 15x R 6\" 15x/L 4\" 15x   TB resisted hip strengthening YTB 15x ea b/l flex/abd/ext YTB 15x ea b/l flex/abd/ext YTB 15x ea b/l flex/abd/ext  YTB 15x ea bl/ " "flex/abd/ext    TB resisted sidestepping YTB 2 laps @ bar ea dir b/l  YTB 2 laps @ bar ea dir b/l YTB 2 laps @ bar ea dir b/l   YTB 2 laps @ bar ea dir b/l     Step downs  4\" 10x   4\" *pain; also attemtped L taps *pain    THERAPEUTIC ACTIVITY         Step ups 4\" x10 ea b/l w/ drive through heel 6\" x10 b/l w/ drive through heel     6\" x10 b/l w/ drive through heel    STS 5# 15x 5# 15x 10# 15x 10# 20x     Box lifts  NV  16# 5x from low mat; 10 x from floor 16# 5x from low mat; 10 x from floor     Box carry    3 laps      TRX squats to 12\" box      12\" + 4\" 10x      Gait Training                                   Modalities 4/12 4/17 4/24 5/1 5/3  5/8  5/17  5/24     CP PRN Home PRB  10'  10'  10' 10'  10'  10'  10'                                                                 "

## 2023-05-31 ENCOUNTER — EVALUATION (OUTPATIENT)
Dept: PHYSICAL THERAPY | Facility: CLINIC | Age: 48
End: 2023-05-31

## 2023-05-31 DIAGNOSIS — M25.562 LEFT KNEE PAIN, UNSPECIFIED CHRONICITY: ICD-10-CM

## 2023-05-31 DIAGNOSIS — M25.561 RIGHT KNEE PAIN, UNSPECIFIED CHRONICITY: Primary | ICD-10-CM

## 2023-05-31 NOTE — PROGRESS NOTES
PT Re-Evaluation     Today's date: 2023  Patient name: Last Avilez  : 1975  MRN: [id number]  Referring provider: Gilda Gtz MD  Dx:   Encounter Diagnosis     ICD-10-CM    1  Right knee pain, unspecified chronicity  M25 561       2  Left knee pain, unspecified chronicity  M25 562                      Assessment  Assessment details: Last Avilez is a 46 y o  female being treated as an outpatient to [facility] w/ initial c/o b/l knee pain  Since IE, pt has made gains in frequency of pain, pain reduction, ROM, and strength, but continues to remain limited in these areas and from max function  Pt will continue to benefit from skilled PT services to address the above deficits in order to max function to allow pt to achieve goals in PT  Thank you  Impairments: abnormal muscle tone, abnormal or restricted ROM, activity intolerance, impaired physical strength and pain with function  Understanding of Dx/Px/POC: good   Prognosis: good    Goals  ST  Pain decreased by 25% in 4-6 weeks  - PROGRESSING  2  ROM increased by 25% in 4-6 weeks  - PROGRESSING  3  Strength increased by 1/2 to 1 muscle grade in all deficient muscle groups in 4-6 weeks  PROGRESSING    LT  Decrease pain to 1-2/10 at worst by d/c - PROGRESSING  2  Increase ROM to [facility] for all deficient movements by d/c - PROGRESSING  3  Strength increased to 5 for all deficient muscle groups by d/c  PROGRESSING  4  IADL performance increased to max function by d/c  PROGRESSING  5  Recreational performance increased to max function by d/c  PROGRESSING  6  Ambulation/stair climbing improved to max level of function by d/c   PROGRESSING    Plan  Planned modality interventions: cryotherapy, TENS and thermotherapy: hydrocollator packs  Other planned modality interventions: other modalities PRN  Planned therapy interventions: abdominal trunk stabilization, ADL retraining, IADL retraining, balance, flexibility, functional ROM exercises, "graded exercise, home exercise program, manual therapy, neuromuscular re-education, patient education, strengthening, therapeutic exercise, therapeutic activities and joint mobilization  Other planned therapy interventions: other interventions PRN  Frequency: 1-2x/week  Duration in weeks: 4  Plan of Care beginning date: 5/31/2023  Plan of Care expiration date: 6/28/2023  Treatment plan discussed with: patient        Subjective Evaluation    History of Present Illness  Mechanism of injury: CURRENT LEVEL (5/31/23)   Pt reports feeling \"at least 25%\" recovered at this time  She no longer has much pain at rest when lying down and states that she can tolerate more activity than she could at IE  Overall, she reports less frequently experiencing 4/10 pain      Since IE, pt can tolerate longer periods of walking/standing and has less difficulty/pain negotiating hills, walking over uneven terrain, and w/ functional squat  She notes that she has less difficulty ascending/descending stairs w/ RLE, but still has relatively the same difficulty w/ LLE  INITIAL LEVEL (4/12/23)  Pt reports to IE w/ c/o b/l knee pain  Pt reports that she \"twisted left knee\" doing yard work in June of 2022  She reports that she attempted to treat on her own using CP, self stretching, but pain did not resolve  She reports seeing orthopedic in August of 2022 and had injection at that time- states that it may have helped slightly, but not much  Pt reports that R knee started to become painful in November most likely due to compensation        Pt has had gel injections b/l in December    Pt had MRI done in early January- states that it demonstrated R bone marrow edema and meniscal tear  Pt reports that she was instructed to rest   Since then, pain has improved, but pt still feels very limited in strength, mobility, and overall function       Pt also report recently being dx'd w/ undifferentiated connective tissue d/o in February " "  Pain  Current pain ratin  At best pain ratin  At worst pain ratin  Location: B/l Knees  Relieving factors: medications (sitting, Mobic, Plaquenil (for connective tissue d/o))  Exacerbated by: hyperextending knee, negotiating stairs (R better vs L),  ascending/descending hills, walking along uneven terrain, functional squat, transition from sit --> stand after extended periods of sitting > 10-15 minutes  Social Support  Steps to enter house: yes (couple of steps off deck w/ rail)  Stairs in house: yes (1 FF steps w/ rail; Pt intermittently able to ascend/descends stairs in reciprocating fashion, but if carrying objects up stairs needs STS fashion)   Lives in: multiple-level home  Lives with: spouse (teenage children)    Employment status: working (Pt works for an 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)  Patient Goals  Patient goal: \"not feel like I'm going to reinjure knee every time I do something\";  Negotiate stairs in reciprocating fashion w/o pain (Progressing)        Objective     Active Range of Motion   Left Knee   Flexion: 126 (*min tightness) degrees   Extension: 2 degrees with pain    Right Knee   Flexion: 120 degrees   Extension: 5 degrees with pain    Strength/Myotome Testing     Left Knee   Flexion: 4+  Extension: 4+ (*min pain)  Quadriceps contraction: good (*pain)    Right Knee   Flexion: 4+ (*min pain)  Extension: 4+  Quadriceps contraction: good    Additional Strength Details  Hip Strength (L/R):   Flexion (assessed in supine via SLR):  4+/4+    Abduction: (assessed in s/l): 4+/4   ER (assessed in s/l): 4+/4+      Tests     Additional Tests Details  Palpation:  L knee: Tenderness w/ palpation to medial knee joint  R knee: Tenderness w/ palpation to medial worse vs lateral knee joint    Knee Special Tests (L/R- assessed at IE):  Lachman: negative/negative  Reverse Lachman: negative/negative  Varus/valgus Stress: negative/negative  Lyndsey: negative/negative          Daily " "Treatment Diary    EPOC: 6/28/23  Precautions: Recently dx'd w/ undifferentiated connective tissue disorder  Co- Morbidities: Recently dx'd w/ undifferentiated connective tissue disorder      Manual  5/8  5/10  5/17  5/19 5/22 5/24 5/31        B/L knee/patellar PROM  RB  JH  RB  JH RB RB RB        k-tape patellar support  RB    RB  JH            TPR to distal ITB-L         RB RB RB                                                  Exercise Diary  5/8  5/10 5/17 5/19 5/22 5/24 5/31      [facility]             Pt ed             Progress Note       RB + FOTO capture, discussion about future PT      Recumbent Bike  6'  6' 6' 6' 6' 6' 6'      Gastrocs Stretch  30\"x3 ea b/l   30\"x3 ea b/l  30\"x3 ea b/l  30\"x3 ea b/l  30\"x3 ea b/l   30\"x3 ea b/l       HR/TR Held- foot pain             Active HS stretch w/ ankle pumps   10 pumps x 3 ea b/l   10 pumps x 3 ea b/l  10 pumpsx3 ea b/l  10 pumps x 3 ea b/l       Heel Slides             Supine ITB stretch      30\"x3 ea b/l ; 30\"x3 L off table 30\"x3 ea b/l      Standing ITB stretch      30\"x3       NEURO RE-ED             Quad sets w/ towel roll under knee               SLR flex/abd               Minisquats       TRX w/ shift to L 10\"x10        Mini Lunge     5\" x10 ea b/l         Lat step ups 4\" x 15x ea b/l  4\" x 15x ea b/l    R 6\" 15x/L 4\" 15x R 6\" 15x/L 4\" 15x       TB resisted hip strengthening YTB 15x ea b/l flex/abd/ext YTB 15x ea b/l flex/abd/ext YTB 15x ea b/l flex/abd/ext  YTB 15x ea bl/ flex/abd/ext        TB resisted sidestepping YTB 2 laps @ bar ea dir b/l  YTB 2 laps @ bar ea dir b/l YTB 2 laps @ bar ea dir b/l   YTB 2 laps @ bar ea dir b/l         Step downs  4\" 10x   4\" *pain; also attemtped L taps *pain        THERAPEUTIC ACTIVITY             Step ups 4\" x10 ea b/l w/ drive through heel 6\" x10 b/l w/ drive through heel     6\" x10 b/l w/ drive through heel        STS 5# 15x 5# 15x 10# 15x 10# 20x         Box lifts  NV  16# 5x from low mat; 10 x from floor 16# 5x from low " "mat; 10 x from floor         Box carry    3 laps          TRX squats to 12\" box      12\" + 4\" 10x          Gait Training                                               Modalities 4/12  4/17  4/24  5/1 5/3  5/8  5/17  5/24  5/31        CP PRN Home PRB  10'  10'  10' 10'  10'  10'  10' 10'                                                                               "

## 2023-05-31 NOTE — LETTER
May 31, 2023    [address] THERAPY CLERICAL    Patient: Nallely Chang   YOB: 1975   Date of Visit: 2023     Encounter Diagnosis     ICD-10-CM    1  Right knee pain, unspecified chronicity  M25 561       2  Left knee pain, unspecified chronicity  M25 562           Dear Dr Shanell Bobby:    Thank you for your recent referral of Nallely Chang  Please review the attached evaluation summary from Jaida's recent visit  Please verify that you agree with the plan of care by signing the attached order  If you have any questions or concerns, please do not hesitate to call  I sincerely appreciate the opportunity to share in the care of one of your patients and hope to have another opportunity to work with you in the near future  Sincerely,    Iraj Finney, PT      Referring Provider:      I certify that I have read the below Plan of Care and certify the need for these services furnished under this plan of treatment while under my care  [facility] THERAPY CLERICAL  [place]          PT Re-Evaluation     Today's date: 2023  Patient name: Nallely Chang  : 1975  MRN: [id number]  Referring provider: Db Rosales MD  Dx:   Encounter Diagnosis     ICD-10-CM    1  Right knee pain, unspecified chronicity  M25 561       2  Left knee pain, unspecified chronicity  M25 562                      Assessment  Assessment details: Nallely Chang is a 46 y o  female being treated as an outpatient [facility] w/ initial c/o b/l knee pain  Since IE, pt has made gains in frequency of pain, pain reduction, ROM, and strength, but continues to remain limited in these areas and from max function  Pt will continue to benefit from skilled PT services to address the above deficits in order to max function to allow pt to achieve goals in PT  Thank you      Impairments: abnormal muscle tone, abnormal or restricted ROM, activity intolerance, impaired "physical strength and pain with function  Understanding of Dx/Px/POC: good   Prognosis: good    Goals  ST  Pain decreased by 25% in 4-6 weeks  - PROGRESSING  2  ROM increased by 25% in 4-6 weeks  - PROGRESSING  3  Strength increased by 1/2 to 1 muscle grade in all deficient muscle groups in 4-6 weeks  PROGRESSING    LT  Decrease pain to 1-2/10 at worst by d/c - PROGRESSING  2  Increase ROM to [facility] for all deficient movements by d/c - PROGRESSING  3  Strength increased to 5 for all deficient muscle groups by d/c  PROGRESSING  4  IADL performance increased to max function by d/c  PROGRESSING  5  Recreational performance increased to max function by d/c  PROGRESSING  6  Ambulation/stair climbing improved to max level of function by d/c  PROGRESSING    Plan  Planned modality interventions: cryotherapy, TENS and thermotherapy: hydrocollator packs  Other planned modality interventions: other modalities PRN  Planned therapy interventions: abdominal trunk stabilization, ADL retraining, IADL retraining, balance, flexibility, functional ROM exercises, graded exercise, home exercise program, manual therapy, neuromuscular re-education, patient education, strengthening, therapeutic exercise, therapeutic activities and joint mobilization  Other planned therapy interventions: other interventions PRN  Frequency: 1-2x/week  Duration in weeks: 4  Plan of Care beginning date: 2023  Plan of Care expiration date: 2023  Treatment plan discussed with: patient        Subjective Evaluation    History of Present Illness  Mechanism of injury: CURRENT LEVEL (23)   Pt reports feeling \"at least 25%\" recovered at this time  She no longer has much pain at rest when lying down and states that she can tolerate more activity than she could at IE  Overall, she reports less frequently experiencing 4/10 pain         Since IE, pt can tolerate longer periods of walking/standing and has less difficulty/pain negotiating hills, walking " "over uneven terrain, and w/ functional squat  She notes that she has less difficulty ascending/descending stairs w/ RLE, but still has relatively the same difficulty w/ LLE  INITIAL LEVEL (23)  Pt reports to IE w/ c/o b/l knee pain  Pt reports that she \"twisted left knee\" doing yard work in 2022  She reports that she attempted to treat on her own using CP, self stretching, but pain did not resolve  She reports seeing orthopedic in 2022 and had injection at that time- states that it may have helped slightly, but not much  Pt reports that R knee started to become painful in November most likely due to compensation        Pt has had gel injections b/l in December    Pt had MRI done in early January- states that it demonstrated R bone marrow edema and meniscal tear  Pt reports that she was instructed to rest   Since then, pain has improved, but pt still feels very limited in strength, mobility, and overall function  Pt also report recently being dx'd w/ undifferentiated connective tissue d/o in 2022  Pain  Current pain ratin  At best pain ratin  At worst pain ratin  Location: B/l Knees  Relieving factors: medications (sitting, Mobic, Plaquenil (for connective tissue d/o))  Exacerbated by: hyperextending knee, negotiating stairs (R better vs L),  ascending/descending hills, walking along uneven terrain, functional squat, transition from sit --> stand after extended periods of sitting > 10-15 minutes      Social Support  Steps to enter house: yes (couple of steps off deck w/ rail)  Stairs in house: yes (1 FF steps w/ rail; Pt intermittently able to ascend/descends stairs in reciprocating fashion, but if carrying objects up stairs needs STS fashion)   Lives in: multiple-level home  Lives with: spouse (teenage children)    Employment status: working (Pt works for an [employer]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)  Patient Goals  Patient goal: \"not feel like I'm going " "to reinjure knee every time I do something\";  Negotiate stairs in reciprocating fashion w/o pain (Progressing)        Objective     Active Range of Motion   Left Knee   Flexion: 126 (*min tightness) degrees   Extension: 2 degrees with pain    Right Knee   Flexion: 120 degrees   Extension: 5 degrees with pain    Strength/Myotome Testing     Left Knee   Flexion: 4+  Extension: 4+ (*min pain)  Quadriceps contraction: good (*pain)    Right Knee   Flexion: 4+ (*min pain)  Extension: 4+  Quadriceps contraction: good    Additional Strength Details  Hip Strength (L/R):   Flexion (assessed in supine via SLR):  4+/4+    Abduction: (assessed in s/l): 4+/4   ER (assessed in s/l): 4+/4+      Tests     Additional Tests Details  Palpation:  L knee: Tenderness w/ palpation to medial knee joint  R knee: Tenderness w/ palpation to medial worse vs lateral knee joint    Knee Special Tests (L/R- assessed at IE):  Lachman: negative/negative  Reverse Lachman: negative/negative  Varus/valgus Stress: negative/negative  Lyndsey: negative/negative          Daily Treatment Diary    EPOC: 6/28/23  Precautions: Recently dx'd w/ undifferentiated connective tissue disorder  Co- Morbidities: Recently dx'd w/ undifferentiated connective tissue disorder      Manual  5/8  5/10  5/17  5/19 5/22 5/24 5/31        B/L knee/patellar PROM  RB  JH  RB  JH RB RB RB        k-tape patellar support  RB    RB  JH            TPR to distal ITB-L         RB RB RB                                                  Exercise Diary  5/8  5/10 5/17 5/19 5/22 5/24 5/31      [facility]             Pt ed             Progress Note       RB + FOTO capture, discussion about future PT      Recumbent Bike  6'  6' 6' 6' 6' 6' 6'      Gastrocs Stretch  30\"x3 ea b/l   30\"x3 ea b/l  30\"x3 ea b/l  30\"x3 ea b/l  30\"x3 ea b/l   30\"x3 ea b/l       HR/TR Held- foot pain             Active HS stretch w/ ankle pumps   10 pumps x 3 ea b/l   10 pumps x 3 ea b/l  10 pumpsx3 ea b/l  10 pumps x 3 ea " "b/l       Heel Slides             Supine ITB stretch      30\"x3 ea b/l ; 30\"x3 L off table 30\"x3 ea b/l      Standing ITB stretch      30\"x3       NEURO RE-ED             Quad sets w/ towel roll under knee               SLR flex/abd               Minisquats       TRX w/ shift to L 10\"x10        Mini Lunge     5\" x10 ea b/l         Lat step ups 4\" x 15x ea b/l  4\" x 15x ea b/l    R 6\" 15x/L 4\" 15x R 6\" 15x/L 4\" 15x       TB resisted hip strengthening YTB 15x ea b/l flex/abd/ext YTB 15x ea b/l flex/abd/ext YTB 15x ea b/l flex/abd/ext  YTB 15x ea bl/ flex/abd/ext        TB resisted sidestepping YTB 2 laps @ bar ea dir b/l  YTB 2 laps @ bar ea dir b/l YTB 2 laps @ bar ea dir b/l   YTB 2 laps @ bar ea dir b/l         Step downs  4\" 10x   4\" *pain; also attemtped L taps *pain        THERAPEUTIC ACTIVITY             Step ups 4\" x10 ea b/l w/ drive through heel 6\" x10 b/l w/ drive through heel     6\" x10 b/l w/ drive through heel        STS 5# 15x 5# 15x 10# 15x 10# 20x         Box lifts  NV  16# 5x from low mat; 10 x from floor 16# 5x from low mat; 10 x from floor         Box carry    3 laps          TRX squats to 12\" box      12\" + 4\" 10x          Gait Training                                               Modalities 4/12 4/17 4/24  5/1 5/3  5/8  5/17  5/24  5/31        CP PRN Home PRB  10'  10'  10' 10'  10'  10'  10' 10'                                                                                               "

## 2023-05-31 NOTE — LETTER
May 31, 2023    Sharyn Siddiqui MD  [address] [place] [id number]    Patient: Abdirizak Parkinson   YOB: 1975   Date of Visit: 2023     Encounter Diagnosis     ICD-10-CM    1  Right knee pain, unspecified chronicity  M25 561       2  Left knee pain, unspecified chronicity  M25 562           Dear Dr Tiffanie Morgan: Thank you for your recent referral of Abdirizak Parkinson  Please review the attached evaluation summary from Jaida's recent visit  Please verify that you agree with the plan of care by signing the attached order  If you have any questions or concerns, please do not hesitate to call  I sincerely appreciate the opportunity to share in the care of one of your patients and hope to have another opportunity to work with you in the near future  Sincerely,    Iraj Finney, PT      Referring Provider:      I certify that I have read the below Plan of Care and certify the need for these services furnished under this plan of treatment while under my care  Sharyn Siddiqui MD  [address] [place] [id number]  Via Fax: [phone]          PT Re-Evaluation     Today's date: 2023  Patient name: Abdirizak Parkinson  : 1975  MRN: [id number]  Referring provider: Mustapah Berry MD  Dx:   Encounter Diagnosis     ICD-10-CM    1  Right knee pain, unspecified chronicity  M25 561       2  Left knee pain, unspecified chronicity  M25 562                      Assessment  Assessment details: Abdirizak Parkinson is a 46 y o  female being [facility] as an outpatient [facility] w/ initial c/o b/l knee pain  Since IE, pt has made gains in frequency of pain, pain reduction, ROM, and strength, but continues to remain limited in these areas and from max function  Pt will continue to benefit from skilled PT services to address the above deficits in order to max function to allow pt to achieve goals in PT  Thank you      Impairments: abnormal "muscle tone, abnormal or restricted ROM, activity intolerance, impaired physical strength and pain with function  Understanding of Dx/Px/POC: good   Prognosis: good    Goals  ST  Pain decreased by 25% in 4-6 weeks  - PROGRESSING  2  ROM increased by 25% in 4-6 weeks  - PROGRESSING  3  Strength increased by 1/2 to 1 muscle grade in all deficient muscle groups in 4-6 weeks  PROGRESSING    LT  Decrease pain to 1-2/10 at worst by d/c - PROGRESSING  2  Increase ROM to [facility] for all deficient movements by d/c - PROGRESSING  3  Strength increased to 5 for all deficient muscle groups by d/c  PROGRESSING  4  IADL performance increased to max function by d/c  PROGRESSING  5  Recreational performance increased to max function by d/c  PROGRESSING  6  Ambulation/stair climbing improved to max level of function by d/c  PROGRESSING    Plan  Planned modality interventions: cryotherapy, TENS and thermotherapy: hydrocollator packs  Other planned modality interventions: other modalities PRN  Planned therapy interventions: abdominal trunk stabilization, ADL retraining, IADL retraining, balance, flexibility, functional ROM exercises, graded exercise, home exercise program, manual therapy, neuromuscular re-education, patient education, strengthening, therapeutic exercise, therapeutic activities and joint mobilization  Other planned therapy interventions: other interventions PRN  Frequency: 1-2x/week  Duration in weeks: 4  Plan of Care beginning date: 2023  Plan of Care expiration date: 2023  Treatment plan discussed with: patient        Subjective Evaluation    History of Present Illness  Mechanism of injury: CURRENT LEVEL (23)   Pt reports feeling \"at least 25%\" recovered at this time  She no longer has much pain at rest when lying down and states that she can tolerate more activity than she could at IE  Overall, she reports less frequently experiencing 4/10 pain         Since IE, pt can tolerate longer periods of " "walking/standing and has less difficulty/pain negotiating hills, walking over uneven terrain, and w/ functional squat  She notes that she has less difficulty ascending/descending stairs w/ RLE, but still has relatively the same difficulty w/ LLE  INITIAL LEVEL (23)  Pt reports to IE w/ c/o b/l knee pain  Pt reports that she \"twisted left knee\" doing yard work in 2022  She reports that she attempted to treat on her own using CP, self stretching, but pain did not resolve  She reports seeing orthopedic in 2022 and had injection at that time- states that it may have helped slightly, but not much  Pt reports that R knee started to become painful in November most likely due to compensation        Pt has had gel injections b/l in December    Pt had MRI done in early January- states that it demonstrated R bone marrow edema and meniscal tear  Pt reports that she was instructed to rest   Since then, pain has improved, but pt still feels very limited in strength, mobility, and overall function  Pt also report recently being dx'd w/ undifferentiated connective tissue d/o in 2022  Pain  Current pain ratin  At best pain ratin  At worst pain ratin  Location: B/l Knees  Relieving factors: medications (sitting, Mobic, Plaquenil (for connective tissue d/o))  Exacerbated by: hyperextending knee, negotiating stairs (R better vs L),  ascending/descending hills, walking along uneven terrain, functional squat, transition from sit --> stand after extended periods of sitting > 10-15 minutes      Social Support  Steps to enter house: yes (couple of steps off deck w/ rail)  Stairs in house: yes (1 FF steps w/ rail; Pt intermittently able to ascend/descends stairs in reciprocating fashion, but if carrying objects up stairs needs STS fashion)   Lives in: multiple-level home  Lives with: spouse (teenage children)    Employment status: working (Pt works for an [employer] agency- does a large " "amount of sitting)  Patient Goals  Patient goal: \"not feel like I'm going to reinjure knee every time I do something\";  Negotiate stairs in reciprocating fashion w/o pain (Progressing)        Objective     Active Range of Motion   Left Knee   Flexion: 126 (*min tightness) degrees   Extension: 2 degrees with pain    Right Knee   Flexion: 120 degrees   Extension: 5 degrees with pain    Strength/Myotome Testing     Left Knee   Flexion: 4+  Extension: 4+ (*min pain)  Quadriceps contraction: good (*pain)    Right Knee   Flexion: 4+ (*min pain)  Extension: 4+  Quadriceps contraction: good    Additional Strength Details  Hip Strength (L/R):   Flexion (assessed in supine via SLR):  4+/4+    Abduction: (assessed in s/l): 4+/4   ER (assessed in s/l): 4+/4+      Tests     Additional Tests Details  Palpation:  L knee: Tenderness w/ palpation to medial knee joint  R knee: Tenderness w/ palpation to medial worse vs lateral knee joint    Knee Special Tests (L/R- assessed at IE):  Lachman: negative/negative  Reverse Lachman: negative/negative  Varus/valgus Stress: negative/negative  Lyndsey: negative/negative          Daily Treatment Diary    EPOC: 6/28/23  Precautions: Recently dx'd w/ undifferentiated connective tissue disorder  Co- Morbidities: Recently dx'd w/ undifferentiated connective tissue disorder      Manual  5/8  5/10  5/17  5/19 5/22 5/24 5/31        B/L knee/patellar PROM  RB  JH  RB  JH RB RB RB        k-tape patellar support  RB    RB  JH            TPR to distal ITB-L         RB RB RB                                                  Exercise Diary  5/8  5/10 5/17 5/19 5/22 5/24 5/31      [facility]             Pt ed             Progress Note       RB + FOTO capture, discussion about future PT      Recumbent Bike  6'  6' 6' 6' 6' 6' 6'      Gastrocs Stretch  30\"x3 ea b/l   30\"x3 ea b/l  30\"x3 ea b/l  30\"x3 ea b/l  30\"x3 ea b/l   30\"x3 ea b/l       HR/TR Held- foot pain             Active HS stretch w/ ankle pumps   10 " "pumps x 3 ea b/l   10 pumps x 3 ea b/l  10 pumpsx3 ea b/l  10 pumps x 3 ea b/l       Heel Slides             Supine ITB stretch      30\"x3 ea b/l ; 30\"x3 L off table 30\"x3 ea b/l      Standing ITB stretch      30\"x3       NEURO RE-ED             Quad sets w/ towel roll under knee               SLR flex/abd               Minisquats       TRX w/ shift to L 10\"x10        Mini Lunge     5\" x10 ea b/l         Lat step ups 4\" x 15x ea b/l  4\" x 15x ea b/l    R 6\" 15x/L 4\" 15x R 6\" 15x/L 4\" 15x       TB resisted hip strengthening YTB 15x ea b/l flex/abd/ext YTB 15x ea b/l flex/abd/ext YTB 15x ea b/l flex/abd/ext  YTB 15x ea bl/ flex/abd/ext        TB resisted sidestepping YTB 2 laps @ bar ea dir b/l  YTB 2 laps @ bar ea dir b/l YTB 2 laps @ bar ea dir b/l   YTB 2 laps @ bar ea dir b/l         Step downs  4\" 10x   4\" *pain; also attemtped L taps *pain        THERAPEUTIC ACTIVITY             Step ups 4\" x10 ea b/l w/ drive through heel 6\" x10 b/l w/ drive through heel     6\" x10 b/l w/ drive through heel        STS 5# 15x 5# 15x 10# 15x 10# 20x         Box lifts  NV  16# 5x from low mat; 10 x from floor 16# 5x from low mat; 10 x from floor         Box carry    3 laps          TRX squats to 12\" box      12\" + 4\" 10x          Gait Training                                               Modalities 4/12  4/17  4/24  5/1 5/3  5/8  5/17  5/24  5/31        CP PRN Home PRB  10'  10'  10' 10'  10'  10'  10' 10'                                                                                               "

## 2023-06-05 ENCOUNTER — OFFICE VISIT (OUTPATIENT)
Dept: PHYSICAL THERAPY | Facility: CLINIC | Age: 48
End: 2023-06-05
Payer: COMMERCIAL

## 2023-06-05 DIAGNOSIS — M25.562 LEFT KNEE PAIN, UNSPECIFIED CHRONICITY: ICD-10-CM

## 2023-06-05 DIAGNOSIS — M25.561 RIGHT KNEE PAIN, UNSPECIFIED CHRONICITY: Primary | ICD-10-CM

## 2023-06-05 PROCEDURE — 97112 NEUROMUSCULAR REEDUCATION: CPT

## 2023-06-05 PROCEDURE — 97140 MANUAL THERAPY 1/> REGIONS: CPT

## 2023-06-05 PROCEDURE — 97110 THERAPEUTIC EXERCISES: CPT

## 2023-06-05 PROCEDURE — 97530 THERAPEUTIC ACTIVITIES: CPT

## 2023-06-05 NOTE — PROGRESS NOTES
"Daily Note     Today's date: 2023  Patient name: Eduar Mulligan  : 1975  MRN: [id number]  Referring provider: Khadijah Vines MD  Dx:   Encounter Diagnosis     ICD-10-CM    1  Right knee pain, unspecified chronicity  M25 561       2  Left knee pain, unspecified chronicity  M25 562           Start Time: 0800          Subjective: pt reports her L knee feels tight today from a busy weekend  Pt reports she had to lift and carry a lot this pas weekend and she used the techniques she learned in PT, which helped  Objective: See treatment diary below      Assessment: Tolerated treatment well  Patient would benefit from continued PT  Taped L knee for extra support to patella  Able to increase step height for step ups for R leg, unable to tolerate for L leg due to p!  Started to practice techniques for getting up and down from the floor  Cueing for correct form w/ deadlifting and static lunges  Plan: Continue per plan of care        Daily Treatment Diary    EPOC: 23  Precautions: Recently dx'd w/ undifferentiated connective tissue disorder  Co- Morbidities: Recently dx'd w/ undifferentiated connective tissue disorder      Manual  5/8  5/10  5/17  5/19 5/22 5/24 5/31 6/5       B/L knee/patellar PROM  RB  JH  RB  JH RB RB RB        k-tape patellar support  RB    RB  JH    JH        TPR to distal ITB-L         RB RB RB                                                  Exercise Diary  5/8  5/10 5/17 5/19 5/22 5/24 5/31 6/5     [facility]EX             Pt ed             Progress Note       RB + FOTO capture, discussion about future PT      Recumbent Bike  6'  6' 6' 6' 6' 6' 6' 6'     Gastrocs Stretch  30\"x3 ea b/l   30\"x3 ea b/l  30\"x3 ea b/l  30\"x3 ea b/l  30\"x3 ea b/l   30\"x3 ea b/l  30\"x3 ea b/l     HR/TR Held- foot pain             Active HS stretch w/ ankle pumps   10 pumps x 3 ea b/l   10 pumps x 3 ea b/l  10 pumpsx3 ea b/l  10 pumps x 3 ea b/l  10 pumps x 3 ea b/l      Heel Slides             Supine " "ITB stretch      30\"x3 ea b/l ; 30\"x3 L off table 30\"x3 ea b/l 30\"x3 ea b/l     Standing ITB stretch      30\"x3       NEURO RE-ED             Quad sets w/ towel roll under knee               SLR flex/abd               Minisquats       TRX w/ shift to L 10\"x10   TRX depth to tolerance 10\" 10x     Mini Lunge     5\" x10 ea b/l    Static lunge 6\" + foam 10x     Lat step ups 4\" x 15x ea b/l  4\" x 15x ea b/l    R 6\" 15x/L 4\" 15x R 6\" 15x/L 4\" 15x       TB resisted hip strengthening YTB 15x ea b/l flex/abd/ext YTB 15x ea b/l flex/abd/ext YTB 15x ea b/l flex/abd/ext  YTB 15x ea bl/ flex/abd/ext        TB resisted sidestepping YTB 2 laps @ bar ea dir b/l  YTB 2 laps @ bar ea dir b/l YTB 2 laps @ bar ea dir b/l   YTB 2 laps @ bar ea dir b/l         Step downs  4\" 10x   4\" *pain; also attemtped L taps *pain        THERAPEUTIC ACTIVITY             Step ups 4\" x10 ea b/l w/ drive through heel 6\" x10 b/l w/ drive through heel     6\" x10 b/l w/ drive through heel   8\" R 2x10; 6\" L 2x10     STS 5# 15x 5# 15x 10# 15x 10# 20x         Box lifts  NV  16# 5x from low mat; 10 x from floor 16# 5x from low mat; 10 x from floor    16# 10x from floor      Box carry    3 laps         deadlift        10# 10x      TRX squats to 12\" box      12\" + 4\" 10x          Gait Training                                               Modalities 4/12 4/17 4/24 5/1 5/3  5/8  5/17  5/24  5/31        CP PRN Home PRB  10'  10'  10' 10'  10'  10'  10' 10'                                                                                   "

## 2023-06-08 ENCOUNTER — OFFICE VISIT (OUTPATIENT)
Dept: PHYSICAL THERAPY | Facility: CLINIC | Age: 48
End: 2023-06-08
Payer: COMMERCIAL

## 2023-06-08 DIAGNOSIS — M25.561 RIGHT KNEE PAIN, UNSPECIFIED CHRONICITY: Primary | ICD-10-CM

## 2023-06-08 DIAGNOSIS — M25.562 LEFT KNEE PAIN, UNSPECIFIED CHRONICITY: ICD-10-CM

## 2023-06-08 PROCEDURE — 97530 THERAPEUTIC ACTIVITIES: CPT

## 2023-06-08 PROCEDURE — 97110 THERAPEUTIC EXERCISES: CPT

## 2023-06-08 PROCEDURE — 97112 NEUROMUSCULAR REEDUCATION: CPT

## 2023-06-08 NOTE — PROGRESS NOTES
"Daily Note     Today's date: 2023  Patient name: Remedios Leung  : 1975  MRN: [id number]  Referring provider: Esa Valladares MD  Dx:   Encounter Diagnosis     ICD-10-CM    1  Right knee pain, unspecified chronicity  M25 561       2  Left knee pain, unspecified chronicity  M25 562           Start Time: 0800          Subjective: pt reports no new complaints upon arrival        Objective: See treatment diary below      Assessment: Tolerated treatment well  Patient would benefit from continued PT  Increased weight w/ box lifts, improved form w/ lifting  Pt experienced some more discomfort in L knee t/o exercises  Cueing to properly engage core and glutes t/o  Improved form w/ dead lifts  Plan: Continue per plan of care        Daily Treatment Diary    EPOC: 23  Precautions: Recently dx'd w/ undifferentiated connective tissue disorder  Co- Morbidities: Recently dx'd w/ undifferentiated connective tissue disorder      Manual  5/8  5/10  5/17  5/19 5/22 5/24 5/31 6/5 6/8      B/L knee/patellar PROM  [facility] RB RB RB        k[facility]tape patellar support  [facility]       TPR to distal ITB-L         RB RB RB                                                  Exercise Diary  5/8  5/10 5/17 5/19 5/22 5/24 5/31 6 6/8    THEREX             Pt ed             Progress Note       RB + FOTO capture, discussion about future PT      Recumbent Bike  6'  6' 6' 6' 6' 6' 6' 6' 6'    Gastrocs Stretch  30\"x3 ea b/l   30\"x3 ea b/l  30\"x3 ea b/l  30\"x3 ea b/l  30\"x3 ea b/l   30\"x3 ea b/l  30\"x3 ea b/l 30\"x3 ea b/l    HR/TR Held- foot pain             Active HS stretch w/ ankle pumps   10 pumps x 3 ea b/l   10 pumps x 3 ea b/l  10 pumpsx3 ea b/l  10 pumps x 3 ea b/l  10 pumps x 3 ea b/l  10 pumps x 3 ea b/l     Heel Slides             Supine ITB stretch      30\"x3 ea b/l ; 30\"x3 L off table 30\"x3 ea b/l 30\"x3 ea b/l 30\"x3 ea b/l    Standing ITB stretch      30\"x3       NEURO RE-ED             Quad sets w/ " "towel roll under knee               SLR flex/abd               Minisquats       TRX w/ shift to L 10\"x10   TRX depth to tolerance 10\" 10x TRX depth to tolerance 10\" 10x    Mini Lunge     5\" x10 ea b/l    Static lunge 6\" + foam 10x Static lunge 6\" + foam 10x    Lat step ups 4\" x 15x ea b/l  4\" x 15x ea b/l    R 6\" 15x/L 4\" 15x R 6\" 15x/L 4\" 15x       TB resisted hip strengthening YTB 15x ea b/l flex/abd/ext YTB 15x ea b/l flex/abd/ext YTB 15x ea b/l flex/abd/ext  YTB 15x ea bl/ flex/abd/ext        TB resisted sidestepping YTB 2 laps @ bar ea dir b/l  YTB 2 laps @ bar ea dir b/l YTB 2 laps @ bar ea dir b/l   YTB 2 laps @ bar ea dir b/l     GTB 4 laps    Step downs  4\" 10x   4\" *pain; also attemtped L taps *pain    Step up and over 6\" R 10x; L 4\" 10x    THERAPEUTIC ACTIVITY             Step ups 4\" x10 ea b/l w/ drive through heel 6\" x10 b/l w/ drive through heel     6\" x10 b/l w/ drive through heel   8\" R 2x10; 6\" L 2x10 8\" R x10 10# ea hand; 6\" 0# w/o UE support 10x    STS 5# 15x 5# 15x 10# 15x 10# 20x         Box lifts  NV  16# 5x from low mat; 10 x from floor 16# 5x from low mat; 10 x from floor    16# 10x from floor  21# 10x from floor    Box carry    3 laps         deadlift        10# 10x 10# 2x10     TRX squats to 12\" box      12\" + 4\" 10x          Gait Training                                               Modalities 4/12 4/17 4/24  5/1 5/3  5/8 5/17 5/24 5/31        CP PRN Home PRB  10'  10'  10' 10'  10'  10'  10' 10'                                                                                     "

## 2023-06-12 ENCOUNTER — OFFICE VISIT (OUTPATIENT)
Dept: PHYSICAL THERAPY | Facility: CLINIC | Age: 48
End: 2023-06-12
Payer: COMMERCIAL

## 2023-06-12 DIAGNOSIS — M25.562 LEFT KNEE PAIN, UNSPECIFIED CHRONICITY: ICD-10-CM

## 2023-06-12 DIAGNOSIS — M25.561 RIGHT KNEE PAIN, UNSPECIFIED CHRONICITY: Primary | ICD-10-CM

## 2023-06-12 PROCEDURE — 97112 NEUROMUSCULAR REEDUCATION: CPT | Performed by: PHYSICAL THERAPIST

## 2023-06-12 PROCEDURE — 97530 THERAPEUTIC ACTIVITIES: CPT | Performed by: PHYSICAL THERAPIST

## 2023-06-12 PROCEDURE — 97110 THERAPEUTIC EXERCISES: CPT | Performed by: PHYSICAL THERAPIST

## 2023-06-12 NOTE — PROGRESS NOTES
"Daily Note     Today's date: 2023  Patient name: Andre Lau  : 1975  MRN: [id number]  Referring provider: Jose Shah MD  Dx:   Encounter Diagnosis     ICD-10-CM    1  Right knee pain, unspecified chronicity  M25 561       2  Left knee pain, unspecified chronicity  M25 562                      Subjective: Pt reports that she is still feeling \"stiff\" and that things \"freeze up\" after sitting for extended periods of time  However, she feels that this may be related more to connective tissue d/o and states that overall her activity level is improving significantly  Objective: See treatment diary below      Assessment: Added TB resistance to lateral knee to engage glutes w/ step ups/downs on LLE  Pt noticed a significant improvement in knee pain when engaging glutes  Remainder of ex tolerated well  Improved tissue tone w/ manuals     Plan: Continue per plan of care        Daily Treatment Diary    EPOC: 23  Precautions: Recently dx'd w/ undifferentiated connective tissue disorder  Co- Morbidities: Recently dx'd w/ undifferentiated connective tissue disorder      Manual      B/L knee/patellar PROM RB RB        k-tape patellar support   [facility] RB      TPR to distal ITB-L RB RB   RB                             Exercise Diary      THEREX          Pt ed          Progress Note  RB + FOTO capture, discussion about future PT        Recumbent Bike 6' 6' 6' 6' 6'     Gastrocs Stretch  30\"x3 ea b/l  30\"x3 ea b/l 30\"x3 ea b/l 30\"x3 ea b/l      HR/TR          Active HS stretch w/ ankle pumps 10 pumpsx3 ea b/l  10 pumps x 3 ea b/l  10 pumps x 3 ea b/l  10 pumps x 3 ea b/l  HEP     Heel Slides          Supine ITB stretch 30\"x3 ea b/l ; 30\"x3 L off table 30\"x3 ea b/l 30\"x3 ea b/l 30\"x3 ea b/l HEP     Standing ITB stretch 30\"x3         NEURO RE-ED          Quad sets w/ towel roll under knee          SLR flex/abd          Minisquats   TRX depth to tolerance 10\" 10x " "TRX depth to tolerance 10\" 10x TRX depth to tolerance 10\"x10     Mini Lunge   Static lunge 6\" + foam 10x Static lunge 6\" + foam 10x Static lunge 6\" + foam 10x ea b/l      Lat step ups R 6\" 15x/L 4\" 15x         TB resisted hip strengthening     GTB 15x ea dir b/l      TB resisted sidestepping    GTB 4 laps GTB 2 laps- ankle     Step downs    Step up and over 6\" R 10x; L 4\" 10x      THERAPEUTIC ACTIVITY          Step ups 6\" x10 b/l w/ drive through heel   8\" R 2x10; 6\" L 2x10 8\" R x10 10# ea hand; 6\" 0# w/o UE support 10x Up and Over 8\" R  10# 10x; attempted 4\" 10# w/ L, but pain; 4\" no weight w/ manual TB resist for L hip ER x 10     STS          Box lifts    16# 10x from floor  21# 10x from floor      Box carry          deadlift   10# 10x 10# 2x10       TRX squats to 12\" box           Gait Training                                Modalities 4/12 4/17 4/24  5/1 5/3  5/8 5/17 5/24 5/31 6/12       CP PRN Home PRB  10'  10'  10' 10'  10'  10'  10' 10'  np                                                                                   "

## 2023-06-14 ENCOUNTER — OFFICE VISIT (OUTPATIENT)
Dept: PHYSICAL THERAPY | Facility: CLINIC | Age: 48
End: 2023-06-14
Payer: COMMERCIAL

## 2023-06-14 DIAGNOSIS — M25.562 LEFT KNEE PAIN, UNSPECIFIED CHRONICITY: ICD-10-CM

## 2023-06-14 DIAGNOSIS — M25.561 RIGHT KNEE PAIN, UNSPECIFIED CHRONICITY: Primary | ICD-10-CM

## 2023-06-14 PROCEDURE — 97112 NEUROMUSCULAR REEDUCATION: CPT

## 2023-06-14 PROCEDURE — 97110 THERAPEUTIC EXERCISES: CPT

## 2023-06-14 PROCEDURE — 97530 THERAPEUTIC ACTIVITIES: CPT

## 2023-06-14 NOTE — PROGRESS NOTES
"Daily Note     Today's date: 2023  Patient name: Eduar Mulligan  : 1975  MRN: [id number]  Referring provider: Khadijah Vines MD  Dx:   Encounter Diagnosis     ICD-10-CM    1  Right knee pain, unspecified chronicity  M25 561       2  Left knee pain, unspecified chronicity  M25 562                      Subjective: pt reports no new complaints upon arrival         Objective: See treatment diary below      Assessment: Tolerated treatment well  Patient would benefit from continued PT  Slight improvement in pain in L knee w/ step downs w/ manual resistance w/ tband  Progressed SL balance w/ some LOB noted  Plan: Continue per plan of care        Daily Treatment Diary    EPOC: 23  Precautions: Recently dx'd w/ undifferentiated connective tissue disorder  Co- Morbidities: Recently dx'd w/ undifferentiated connective tissue disorder      Manual      B/L knee/patellar PROM RB RB        k-tape patellar support   [facility] RB      TPR to distal ITB-L RB RB   RB                             Exercise Diary     THEREX          Pt ed          Progress Note  RB + FOTO capture, discussion about future PT        Recumbent Bike 6' 6' 6' 6' 6' TM 5'    Gastrocs Stretch  30\"x3 ea b/l  30\"x3 ea b/l 30\"x3 ea b/l 30\"x3 ea b/l  30\"x3 ea b/l     HR/TR          Active HS stretch w/ ankle pumps 10 pumpsx3 ea b/l  10 pumps x 3 ea b/l  10 pumps x 3 ea b/l  10 pumps x 3 ea b/l  HEP     Heel Slides          Supine ITB stretch 30\"x3 ea b/l ; 30\"x3 L off table 30\"x3 ea b/l 30\"x3 ea b/l 30\"x3 ea b/l HEP     Standing ITB stretch 30\"x3         NEURO RE-ED          Quad sets w/ towel roll under knee          SLR flex/abd          Minisquats   TRX depth to tolerance 10\" 10x TRX depth to tolerance 10\" 10x TRX depth to tolerance 10\"x10 TRX depth to tolerance 10\"x10    Mini Lunge   Static lunge 6\" + foam 10x Static lunge 6\" + foam 10x Static lunge 6\" + foam 10x ea b/l  Static lunge 6\" + " "foam 10x ea b/l     SLS cone reachesq      High table w/ kick stand 3 cones 3x ea side    TB resisted hip strengthening     GTB 15x ea dir b/l  GTB 20x ea dir b/l     TB resisted sidestepping    GTB 4 laps GTB 2 laps- ankle GTB 3 laps- ankle    SLS KB pass      3# 10x ea side kick stand    THERAPEUTIC ACTIVITY          Step ups 6\" x10 b/l w/ drive through heel   8\" R 2x10; 6\" L 2x10 8\" R x10 10# ea hand; 6\" 0# w/o UE support 10x Up and Over 8\" R  10# 10x; attempted 4\" 10# w/ L, but pain; 4\" no weight w/ manual TB resist for L hip ER x 10 Up and Over 8\" R  10# 10x; attempted 4\" 10# w/ L, but pain; 4\" no weight w/ manual TB resist for L hip ER x 10    STS          Box lifts    16# 10x from floor  21# 10x from floor      Box carry          deadlift   10# 10x 10# 2x10       TRX squats to 12\" box           Gait Training                                Modalities 4/12 4/17 4/24  5/1 5/3  5/8  5/17  5/24  5/31 6/12       CP PRN Home PRB  10'  10'  10' 10'  10'  10'  10' 10'  np                                                                                     "

## 2023-06-19 ENCOUNTER — OFFICE VISIT (OUTPATIENT)
Dept: PHYSICAL THERAPY | Facility: CLINIC | Age: 48
End: 2023-06-19
Payer: COMMERCIAL

## 2023-06-19 DIAGNOSIS — M25.561 RIGHT KNEE PAIN, UNSPECIFIED CHRONICITY: Primary | ICD-10-CM

## 2023-06-19 DIAGNOSIS — M25.562 LEFT KNEE PAIN, UNSPECIFIED CHRONICITY: ICD-10-CM

## 2023-06-19 PROCEDURE — 97112 NEUROMUSCULAR REEDUCATION: CPT

## 2023-06-19 PROCEDURE — 97110 THERAPEUTIC EXERCISES: CPT

## 2023-06-19 PROCEDURE — 97530 THERAPEUTIC ACTIVITIES: CPT

## 2023-06-19 NOTE — PROGRESS NOTES
"Daily Note     Today's date: 2023  Patient name: Ellen Castro  : 1975  MRN: [id number]  Referring provider: Wilfredo Carty MD  Dx:   Encounter Diagnosis     ICD-10-CM    1  Right knee pain, unspecified chronicity  M25 561       2  Left knee pain, unspecified chronicity  M25 562           Start Time: 0800          Subjective: pt reports her R knee was a little sore this past weekend  Objective: See treatment diary below      Assessment: Tolerated treatment well  Patient would benefit from continued PT  Improved ability to step up and over step w/ L leg w/ decreased pain and improved mechanics  Plan: Continue per plan of care        Daily Treatment Diary    EPOC: 23  Precautions: Recently dx'd w/ undifferentiated connective tissue disorder  Co- Morbidities: Recently dx'd w/ undifferentiated connective tissue disorder      Manual      B/L knee/patellar PROM RB RB        k-tape patellar support   [facility] RB      TPR to distal ITB-L RB RB   RB                             Exercise Diary    THEREX          Pt ed          Progress Note  RB + FOTO capture, discussion about future PT        Recumbent Bike 6' 6' 6' 6' 6' TM 5' TM 6'   Gastrocs Stretch  30\"x3 ea b/l  30\"x3 ea b/l 30\"x3 ea b/l 30\"x3 ea b/l  30\"x3 ea b/l  30\"x3 ea b/l    HR/TR          Active HS stretch w/ ankle pumps 10 pumpsx3 ea b/l  10 pumps x 3 ea b/l  10 pumps x 3 ea b/l  10 pumps x 3 ea b/l  HEP     Heel Slides          Supine ITB stretch 30\"x3 ea b/l ; 30\"x3 L off table 30\"x3 ea b/l 30\"x3 ea b/l 30\"x3 ea b/l HEP     Standing ITB stretch 30\"x3         NEURO RE-ED          Quad sets w/ towel roll under knee          SLR flex/abd          Minisquats   TRX depth to tolerance 10\" 10x TRX depth to tolerance 10\" 10x TRX depth to tolerance 10\"x10 TRX depth to tolerance 10\"x10 TRX depth to tolerance 10\"x10   Mini Lunge   Static lunge 6\" + foam 10x Static lunge 6\" + foam 10x " "Static lunge 6\" + foam 10x ea b/l  Static lunge 6\" + foam 10x ea b/l  Static lunge 4\" + foam 10x ea b/l    SLS cone reaches      High table w/ kick stand 3 cones 3x ea side High table w/ kick stand 3 cones   TB resisted hip strengthening     GTB 15x ea dir b/l  GTB 20x ea dir b/l  GTB 20x ea dir b/l    TB resisted sidestepping    GTB 4 laps GTB 2 laps- ankle GTB 3 laps- ankle GTB 3 laps- ankle   SLS KB pass      3# 10x ea side kick stand 3# 10x ea side kick stand   THERAPEUTIC ACTIVITY          Step ups 6\" x10 b/l w/ drive through heel   8\" R 2x10; 6\" L 2x10 8\" R x10 10# ea hand; 6\" 0# w/o UE support 10x Up and Over 8\" R  10# 10x; attempted 4\" 10# w/ L, but pain; 4\" no weight w/ manual TB resist for L hip ER x 10 Up and Over 8\" R  10# 10x; attempted 4\" 10# w/ L, but pain; 4\" no weight w/ manual TB resist for L hip ER x 10 Up and Over 8\" R  10# 10x; attempted 4\" 10# w/ L, but pain; 4\" no weight w/ manual TB resist for L hip ER x 10   STS          Box lifts    16# 10x from floor  21# 10x from floor      Box carry          deadlift   10# 10x 10# 2x10       TRX squats to 12\" box           Gait Training                                Modalities 4/12  4/17  4/24  5/1 5/3  5/8  5/17  5/24  5/31 6/12       CP PRN Home PRB  10'  10'  10' 10'  10'  10'  10' 10'  np                                                                                       "

## 2023-06-21 ENCOUNTER — OFFICE VISIT (OUTPATIENT)
Dept: PHYSICAL THERAPY | Facility: CLINIC | Age: 48
End: 2023-06-21
Payer: COMMERCIAL

## 2023-06-21 DIAGNOSIS — M25.561 RIGHT KNEE PAIN, UNSPECIFIED CHRONICITY: Primary | ICD-10-CM

## 2023-06-21 DIAGNOSIS — M25.562 LEFT KNEE PAIN, UNSPECIFIED CHRONICITY: ICD-10-CM

## 2023-06-21 PROCEDURE — 97112 NEUROMUSCULAR REEDUCATION: CPT

## 2023-06-21 PROCEDURE — 97110 THERAPEUTIC EXERCISES: CPT

## 2023-06-21 PROCEDURE — 97530 THERAPEUTIC ACTIVITIES: CPT

## 2023-06-21 NOTE — PROGRESS NOTES
"Daily Note     Today's date: 2023  Patient name: Stefani Anderson  : 1975  MRN: [id number]  Referring provider: Jair Lynch MD  Dx:   Encounter Diagnosis     ICD-10-CM    1  Right knee pain, unspecified chronicity  M25 561       2  Left knee pain, unspecified chronicity  M25 562           Start Time: 0800          Subjective: pt reports she was able to go down the stairs reciprocally w/o difficulty  Objective: See treatment diary below      Assessment: Tolerated treatment well  Patient would benefit from continued PT  Unable to tolerate static lunges due to p!  Progressed functional strengthening w/ good tolerance  Plan: Continue per plan of care  Daily Treatment Diary    EPOC: 23  Precautions: Recently dx'd w/ undifferentiated connective tissue disorder  Co- Morbidities: Recently dx'd w/ undifferentiated connective tissue disorder      Manual      B/L knee/patellar PROM RB RB        k-tape patellar support   [facility] RB      TPR to distal ITB-L RB RB   RB                             Exercise Diary    THEREX          Pt ed          Progress Note          Recumbent Bike TM 6' 1 7-2 2 mph    6' TM 5' TM 6'   Gastrocs Stretch 30\"x3 ea b/l     30\"x3 ea b/l  30\"x3 ea b/l  30\"x3 ea b/l    HR/TR          Active HS stretch w/ ankle pumps     HEP     Heel Slides          Supine ITB stretch     HEP     Standing ITB stretch          NEURO RE-ED          Quad sets w/ towel roll under knee          SLR flex/abd          Minisquats TRX depth to tolerance 10\"x10    TRX depth to tolerance 10\"x10 TRX depth to tolerance 10\"x10 TRX depth to tolerance 10\"x10   Mini Lunge p!     Static lunge 6\" + foam 10x ea b/l  Static lunge 6\" + foam 10x ea b/l  Static lunge 4\" + foam 10x ea b/l    SLS cone reaches      High table w/ kick stand 3 cones 3x ea side High table w/ kick stand 3 cones   TB resisted hip strengthening GTB 20x ea dir b/l     GTB 15x ea dir b/l  GTB " "20x ea dir b/l  GTB 20x ea dir b/l    TB resisted sidestepping GTB 3 laps- ankle    GTB 2 laps- ankle GTB 3 laps- ankle GTB 3 laps- ankle   SLS KB pass 3# 10x ea side kick stand     3# 10x ea side kick stand 3# 10x ea side kick stand   THERAPEUTIC ACTIVITY          Step ups     Up and Over 8\" R  10# 10x; attempted 4\" 10# w/ L, but pain; 4\" no weight w/ manual TB resist for L hip ER x 10 Up and Over 8\" R  10# 10x; attempted 4\" 10# w/ L, but pain; 4\" no weight w/ manual TB resist for L hip ER x 10 Up and Over 8\" R  10# 10x; attempted 4\" 10# w/ L, but pain; 4\" no weight w/ manual TB resist for L hip ER x 10   Resisted walking over hurdles Fwd 12# lat 8# 3 laps ea          Box lifts           Box carry          deadlift           TRX squats to 12\" box 10x + 2\" box          Gait Training                                Modalities 4/12  4/17  4/24  5/1 5/3  5/8  5/17  5/24  5/31 6/12       CP PRN Home PRB  10'  10'  10' 10'  10'  10'  10' 10'  np                                                                                         "

## 2023-06-26 ENCOUNTER — OFFICE VISIT (OUTPATIENT)
Dept: PHYSICAL THERAPY | Facility: CLINIC | Age: 48
End: 2023-06-26
Payer: COMMERCIAL

## 2023-06-26 DIAGNOSIS — M25.562 LEFT KNEE PAIN, UNSPECIFIED CHRONICITY: ICD-10-CM

## 2023-06-26 DIAGNOSIS — M25.561 RIGHT KNEE PAIN, UNSPECIFIED CHRONICITY: Primary | ICD-10-CM

## 2023-06-26 PROCEDURE — 97110 THERAPEUTIC EXERCISES: CPT

## 2023-06-26 PROCEDURE — 97530 THERAPEUTIC ACTIVITIES: CPT

## 2023-06-26 PROCEDURE — 97112 NEUROMUSCULAR REEDUCATION: CPT

## 2023-06-26 PROCEDURE — 97140 MANUAL THERAPY 1/> REGIONS: CPT

## 2023-06-26 NOTE — PROGRESS NOTES
"Daily Note     Today's date: 2023  Patient name: Luana Farah  : 1975  MRN: [id number]  Referring provider: Ryland Guadarrama MD  Dx:   Encounter Diagnosis     ICD-10-CM    1  Right knee pain, unspecified chronicity  M25 561       2  Left knee pain, unspecified chronicity  M25 562           Start Time: 0800          Subjective: pt reports she had a rough weekend w/ her knees  Objective: See treatment diary below      Assessment: Tolerated treatment well  Patient would benefit from continued PT  Taped knee for extra patella support  No reported increased pain w/ exercises this session  Good control w/ resisted walking  Plan: Continue per plan of care  Daily Treatment Diary    EPOC: 23  Precautions: Recently dx'd w/ undifferentiated connective tissue disorder  Co- Morbidities: Recently dx'd w/ undifferentiated connective tissue disorder      Manual     B/L knee/patellar PROM RB RB        k-tape patellar support   [facility]     TPR to distal ITB-L RB RB   RB                             Exercise Diary    THEREX          Pt ed          Progress Note          Recumbent Bike TM 6' 1 7-2 2 mph Bike 6'   6' TM 5' TM 6'   Gastrocs Stretch 30\"x3 ea b/l  30\"x3 ea b/l    30\"x3 ea b/l  30\"x3 ea b/l  30\"x3 ea b/l    HR/TR          Active HS stretch w/ ankle pumps     HEP     Heel Slides          Supine ITB stretch     HEP     Standing ITB stretch          NEURO RE-ED          Quad sets w/ towel roll under knee          SLR flex/abd          Minisquats TRX depth to tolerance 10\"x10 TRX depth to tolerance 10\"x10   TRX depth to tolerance 10\"x10 TRX depth to tolerance 10\"x10 TRX depth to tolerance 10\"x10   Mini Lunge p!     Static lunge 6\" + foam 10x ea b/l  Static lunge 6\" + foam 10x ea b/l  Static lunge 4\" + foam 10x ea b/l    SLS cone reaches      High table w/ kick stand 3 cones 3x ea side High table w/ kick stand 3 cones   TB resisted hip " "strengthening GTB 20x ea dir b/l     GTB 15x ea dir b/l  GTB 20x ea dir b/l  GTB 20x ea dir b/l    TB resisted sidestepping GTB 3 laps- ankle    GTB 2 laps- ankle GTB 3 laps- ankle GTB 3 laps- ankle   SLS KB pass 3# 10x ea side kick stand 3# 10x ea side kick stand    3# 10x ea side kick stand 3# 10x ea side kick stand   THERAPEUTIC ACTIVITY          Step ups     Up and Over 8\" R  10# 10x; attempted 4\" 10# w/ L, but pain; 4\" no weight w/ manual TB resist for L hip ER x 10 Up and Over 8\" R  10# 10x; attempted 4\" 10# w/ L, but pain; 4\" no weight w/ manual TB resist for L hip ER x 10 Up and Over 8\" R  10# 10x; attempted 4\" 10# w/ L, but pain; 4\" no weight w/ manual TB resist for L hip ER x 10   Resisted walking over hurdles Fwd 12# lat 8# 3 laps ea  Fwd 12# lat 8# 5 laps ea no hurdles        Box lifts           Box carry          deadlift           TRX squats to 12\" box 10x + 2\" box          Gait Training                                Modalities 4/12  4/17  4/24  5/1 5/3  5/8  5/17  5/24  5/31 6/12       CP PRN Home PRB  10'  10'  10' 10'  10'  10'  10' 10'  np                                                                                           "

## 2023-06-28 ENCOUNTER — EVALUATION (OUTPATIENT)
Dept: PHYSICAL THERAPY | Facility: CLINIC | Age: 48
End: 2023-06-28
Payer: COMMERCIAL

## 2023-06-28 DIAGNOSIS — M25.562 LEFT KNEE PAIN, UNSPECIFIED CHRONICITY: ICD-10-CM

## 2023-06-28 DIAGNOSIS — M25.561 RIGHT KNEE PAIN, UNSPECIFIED CHRONICITY: Primary | ICD-10-CM

## 2023-06-28 PROCEDURE — 97140 MANUAL THERAPY 1/> REGIONS: CPT | Performed by: PHYSICAL THERAPIST

## 2023-06-28 PROCEDURE — 97110 THERAPEUTIC EXERCISES: CPT | Performed by: PHYSICAL THERAPIST

## 2023-06-28 PROCEDURE — 97112 NEUROMUSCULAR REEDUCATION: CPT | Performed by: PHYSICAL THERAPIST

## 2023-06-28 NOTE — PROGRESS NOTES
PT Re-Evaluation     Today's date: 2023  Patient name: Saloni Bowser  : 1975  MRN: [id number]  Referring provider: Kaleb Rodrigues MD  Dx:   Encounter Diagnosis     ICD-10-CM    1  Right knee pain, unspecified chronicity  M25 561       2  Left knee pain, unspecified chronicity  M25 562                  Assessment  Assessment details: Saloni Bowser is a 46 y o  female being [facility] as an outpatient to [facility] w/ initial c/o b/l knee pain  Since IE, pt has made gains in frequency of pain, ROM,  Strength (via MMT and functional), and overall activity tolerance/function, but continues to remain limited in some of these areas and from max function  Pt will continue to benefit from skilled PT services to address the above deficits in order to max function to allow pt to achieve goals in PT  Consider d/c to updated HEP after the next 2-4 weeks pending progress  Thank you  Impairments: abnormal or restricted ROM, activity intolerance, impaired physical strength and pain with function  Understanding of Dx/Px/POC: good   Prognosis: good    Goals  ST  Pain decreased by 25% in 4-6 weeks  - PROGRESSING  2  ROM increased by 25% in 4-6 weeks  - PROGRESSING  3  Strength increased by 1/2 to 1 muscle grade in all deficient muscle groups in 4-6 weeks  PROGRESSING    LT  Decrease pain to 1-2/10 at worst by d/c - PROGRESSING  2  Increase ROM to [facility] for all deficient movements by d/c - PROGRESSING  3  Strength increased to 5 for all deficient muscle groups by d/c  Partially Met  4  IADL performance increased to max function by d/c  Partially Met  5  Recreational performance increased to max function by d/c  Partially Met  6  Ambulation/stair climbing improved to max level of function by d/c   PROGRESSING    Plan  Planned modality interventions: cryotherapy, TENS and thermotherapy: hydrocollator packs  Other planned modality interventions: other modalities PRN  Planned therapy interventions: "abdominal trunk stabilization, ADL retraining, IADL retraining, balance, flexibility, functional ROM exercises, graded exercise, home exercise program, manual therapy, neuromuscular re-education, patient education, strengthening, therapeutic exercise, therapeutic activities and joint mobilization  Other planned therapy interventions: other interventions PRN  Frequency: 1x week (2-4 more weeks)  Plan of Care beginning date: 6/28/2023  Plan of Care expiration date: 7/26/2023  Treatment plan discussed with: patient        Subjective Evaluation    History of Present Illness  Mechanism of injury: CURRENT LEVEL (6/28/23)  Pt reports continued steady progress since previous progress note  She states that the \"knee joint pain\" that she initially came with has improved significantly  Although still limited, she adds that her balance has been improving overall  She continues to have pain and difficulty descending stairs (mostly due to LLE), but states that this ha also improved significantly since she now has times where she can descend in reciprocating fashion \"without thinking\" for a few steps  Pt also reports that her endurance/fatigue seems to be improving w/ skilled PT tx- she can now tolerate 3000 more steps/day  PREVIOUS LEVEL (5/31/23)   Pt reports feeling \"at least 25%\" recovered at this time  She no longer has much pain at rest when lying down and states that she can tolerate more activity than she could at IE  Overall, she reports less frequently experiencing 4/10 pain      Since IE, pt can tolerate longer periods of walking/standing and has less difficulty/pain negotiating hills, walking over uneven terrain, and w/ functional squat  She notes that she has less difficulty ascending/descending stairs w/ RLE, but still has relatively the same difficulty w/ LLE  INITIAL LEVEL (4/12/23)  Pt reports to IE w/ c/o b/l knee pain  Pt reports that she \"twisted left knee\" doing yard work in June of 2022   " "She reports that she attempted to treat on her own using CP, self stretching, but pain did not resolve  She reports seeing orthopedic in 2022 and had injection at that time- states that it may have helped slightly, but not much  Pt reports that R knee started to become painful in November most likely due to compensation        Pt has had gel injections b/l in December    Pt had MRI done in early January- states that it demonstrated R bone marrow edema and meniscal tear  Pt reports that she was instructed to rest   Since then, pain has improved, but pt still feels very limited in strength, mobility, and overall function  Pt also report recently being dx'd w/ undifferentiated connective tissue d/o in 2022  Pain  Current pain ratin  At best pain ratin  At worst pain ratin  Location: B/l Knees  Relieving factors: medications (sitting, Mobic, Plaquenil (for connective tissue d/o))  Exacerbated by: descending stairs (L), walking along uneven terrain,  transition from sit --> stand after extended periods of sitting > 10-15 minutes  Social Support  Steps to enter house: yes (couple of steps off deck w/ rail)  Stairs in house: yes (1 FF steps w/ rail)   Lives in: multiple-level home  Lives with: spouse (teenage children)    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)  Patient Goals  Patient goal: \"not feel like I'm going to reinjure knee every time I do something\"; Negotiate stairs in reciprocating fashion w/o pain (Progressing)        Objective     Active Range of Motion   Left Knee   Flexion: 129 degrees   Extension: 2 (*discomfort) degrees     Right Knee   Flexion: 121 (Limited by \"tighness\"/calf muscle edema today) degrees   Extension: 5 degrees     Mobility   Patellar Mobility:   Left Knee   WFL: medial, lateral, superior and inferior       Right Knee   WFL: medial, lateral, superior and inferior    Patellar Mobility Comments   Left " "lateral tendon comments: *pain w/ palpation to lateral knee joint       Strength/Myotome Testing     Left Knee   Flexion: 5  Extension: 5  Quadriceps contraction: good (*pain)    Right Knee   Flexion: 5  Extension: 5  Quadriceps contraction: good    Additional Strength Details  Hip Strength (L/R):   Flexion (assessed in supine via SLR):  4+/5    Abduction: (assessed in s/l): 4+/5  ER (assessed in s/l): 5/5      Tests     Additional Tests Details  Palpation:  L knee: Tenderness w/ palpation to medial knee joint and lateral to patella  R knee: Tenderness w/ palpation to medial knee joint    Knee Special Tests (L/R- assessed at IE):  Lachman: negative/negative  Reverse Lachman: negative/negative  Varus/valgus Stress: negative/negative  Lyndsey: negative/negative    Stairs   Pt can ascend in reciprocating fashion and descends- sometimes does not have pain in L knee, but after several steps can feel L knee    Functional squat; improved        Daily Treatment Diary    EPOC: 7/26/23  Precautions: Recently dx'd w/ undifferentiated connective tissue disorder  Co- Morbidities: Recently dx'd w/ undifferentiated connective tissue disorder      Manual 6/8 6/12 6/26 6/28       B/L knee/patellar PROM    RB       k-tape patellar support [facility] RB JH         TPR to distal ITB-L  RB                                   Exercise Diary 6/21 6/26 6/28 6/12 6/14 6/19   THEREX          Pt ed          Progress Note   RB + FOTO capture, discussion about going fwd       Recumbent Bike TM 6' 1 7-2 2 mph Bike 6' Bike 6'  6' TM 5' TM 6'   Gastrocs Stretch 30\"x3 ea b/l  30\"x3 ea b/l  30\"x3 ea b/l   30\"x3 ea b/l  30\"x3 ea b/l  30\"x3 ea b/l    HR/TR   30x HR       Active HS stretch w/ ankle pumps     HEP     Heel Slides          Supine ITB stretch     HEP     Standing ITB stretch          NEURO RE-ED          Quad sets w/ towel roll under knee          SLR flex/abd          Minisquats TRX depth to tolerance 10\"x10 TRX depth to tolerance 10\"x10   TRX " "depth to tolerance 10\"x10 TRX depth to tolerance 10\"x10 TRX depth to tolerance 10\"x10   Mini Lunge p!     Static lunge 6\" + foam 10x ea b/l  Static lunge 6\" + foam 10x ea b/l  Static lunge 4\" + foam 10x ea b/l    SLS cone reaches  High table w/ kick stand 3 cones 3x ea side    High table w/ kick stand 3 cones 3x ea side High table w/ kick stand 3 cones   TB resisted hip strengthening GTB 20x ea dir b/l     GTB 15x ea dir b/l  GTB 20x ea dir b/l  GTB 20x ea dir b/l    TB resisted sidestepping GTB 3 laps- ankle    GTB 2 laps- ankle GTB 3 laps- ankle GTB 3 laps- ankle   SLS KB pass 3# 10x ea side kick stand 3# 10x ea side kick stand    3# 10x ea side kick stand 3# 10x ea side kick stand   THERAPEUTIC ACTIVITY          Step ups     Up and Over 8\" R  10# 10x; attempted 4\" 10# w/ L, but pain; 4\" no weight w/ manual TB resist for L hip ER x 10 Up and Over 8\" R  10# 10x; attempted 4\" 10# w/ L, but pain; 4\" no weight w/ manual TB resist for L hip ER x 10 Up and Over 8\" R  10# 10x; attempted 4\" 10# w/ L, but pain; 4\" no weight w/ manual TB resist for L hip ER x 10   Resisted walking over hurdles Fwd 12# lat 8# 3 laps ea  Fwd 12# lat 8# 5 laps ea no hurdles        Box lifts           Box carry          deadlift           TRX squats to 12\" box 10x + 2\" box          Gait Training                                Modalities 4/12  4/17  4/24  5/1 5/3  5/8  5/17  5/24  5/31 6/12       CP PRN Home PRB  10'  10'  10' 10'  10'  10'  10' 10'  np                                                                                           "

## 2023-06-28 NOTE — LETTER
2023    Ignacia Manzano MD  [address] [place] [address]    Patient: Abdiaziz Burnham   YOB: 1975   Date of Visit: 2023     Encounter Diagnosis     ICD-10-CM    1  Right knee pain, unspecified chronicity  M25 561       2  Left knee pain, unspecified chronicity  M25 562           Dear Dr Chavarria Likes: Thank you for your recent referral of Abdiaziz Burnham  Please review the attached evaluation summary from Jaida's recent visit  Please verify that you agree with the plan of care by signing the attached order  If you have any questions or concerns, please do not hesitate to call  I sincerely appreciate the opportunity to share in the care of one of your patients and hope to have another opportunity to work with you in the near future  Sincerely,    Iraj Finney, PT      Referring Provider:      I certify that I have read the below Plan of Care and certify the need for these services furnished under this plan of treatment while under my care  Ignacia Manzano MD  [address] [place] [id number]  Via Fax: [phone]             PT Re-Evaluation     Today's date: 2023  Patient name: Abdiaziz Burnham  : 1975  MRN: [id number]  Referring provider: Blanka Gage MD  Dx:   Encounter Diagnosis     ICD-10-CM    1  Right knee pain, unspecified chronicity  M25 561       2  Left knee pain, unspecified chronicity  M25 562                  Assessment  Assessment details: Abdiaziz Burnham is a 46 y o  female being treated as an outpatient to [facility] w/ initial c/o b/l knee pain  Since IE, pt has made gains in frequency of pain, ROM,  Strength (via MMT and functional), and overall activity tolerance/function, but continues to remain limited in some of these areas and from max function    Pt will continue to benefit from skilled PT services to address the above deficits in order to max function to allow pt to "achieve goals in PT  Consider d/c to updated HEP after the next 2-4 weeks pending progress  Thank you  Impairments: abnormal or restricted ROM, activity intolerance, impaired physical strength and pain with function  Understanding of Dx/Px/POC: good   Prognosis: good    Goals  ST  Pain decreased by 25% in 4-6 weeks  - PROGRESSING  2  ROM increased by 25% in 4-6 weeks  - PROGRESSING  3  Strength increased by 1/2 to 1 muscle grade in all deficient muscle groups in 4-6 weeks  PROGRESSING    LT  Decrease pain to 1-2/10 at worst by d/c - PROGRESSING  2  Increase ROM to [facility] for all deficient movements by d/c - PROGRESSING  3  Strength increased to 5 for all deficient muscle groups by d/c  Partially Met  4  IADL performance increased to max function by d/c  Partially Met  5  Recreational performance increased to max function by d/c  Partially Met  6  Ambulation/stair climbing improved to max level of function by d/c  PROGRESSING    Plan  Planned modality interventions: cryotherapy, TENS and thermotherapy: hydrocollator packs  Other planned modality interventions: other modalities PRN  Planned therapy interventions: abdominal trunk stabilization, ADL retraining, IADL retraining, balance, flexibility, functional ROM exercises, graded exercise, home exercise program, manual therapy, neuromuscular re-education, patient education, strengthening, therapeutic exercise, therapeutic activities and joint mobilization  Other planned therapy interventions: other interventions PRN  Frequency: 1x week (2-4 more weeks)  Plan of Care beginning date: 2023  Plan of Care expiration date: 2023  Treatment plan discussed with: patient        Subjective Evaluation    History of Present Illness  Mechanism of injury: CURRENT LEVEL (23)  Pt reports continued steady progress since previous progress note  She states that the \"knee joint pain\" that she initially came with has improved significantly     Although still " "limited, she adds that her balance has been improving overall  She continues to have pain and difficulty descending stairs (mostly due to LLE), but states that this ha also improved significantly since she now has times where she can descend in reciprocating fashion \"without thinking\" for a few steps  Pt also reports that her endurance/fatigue seems to be improving w/ skilled PT tx- she can now tolerate 3000 more steps/day  PREVIOUS LEVEL (23)   Pt reports feeling \"at least 25%\" recovered at this time  She no longer has much pain at rest when lying down and states that she can tolerate more activity than she could at IE  Overall, she reports less frequently experiencing 4/10 pain      Since IE, pt can tolerate longer periods of walking/standing and has less difficulty/pain negotiating hills, walking over uneven terrain, and w/ functional squat  She notes that she has less difficulty ascending/descending stairs w/ RLE, but still has relatively the same difficulty w/ LLE  INITIAL LEVEL (23)  Pt reports to IE w/ c/o b/l knee pain  Pt reports that she \"twisted left knee\" doing yard work in 2022  She reports that she attempted to treat on her own using CP, self stretching, but pain did not resolve  She reports seeing orthopedic in 2022 and had injection at that time- states that it may have helped slightly, but not much  Pt reports that R knee started to become painful in November most likely due to compensation        Pt has had gel injections b/l in December    Pt had MRI done in early January- states that it demonstrated R bone marrow edema and meniscal tear  Pt reports that she was instructed to rest   Since then, pain has improved, but pt still feels very limited in strength, mobility, and overall function  Pt also report recently being dx'd w/ undifferentiated connective tissue d/o in 2022    Pain  Current pain ratin  At best pain ratin  At " "worst pain ratin  Location: B/l Knees  Relieving factors: medications (sitting, Mobic, Plaquenil (for connective tissue d/o))  Exacerbated by: descending stairs (L), walking along uneven terrain,  transition from sit --> stand after extended periods of sitting > 10-15 minutes  Social Support  Steps to enter house: yes (couple of steps off deck w/ rail)  Stairs in house: yes (1 FF steps w/ rail)   Lives in: multiple-level home  Lives with: spouse (teenage children)    Employment status: working (Pt works for an [employer]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)  Patient Goals  Patient goal: \"not feel like I'm going to reinjure knee every time I do something\"; Negotiate stairs in reciprocating fashion w/o pain (Progressing)        Objective     Active Range of Motion   Left Knee   Flexion: 129 degrees   Extension: 2 (*discomfort) degrees     Right Knee   Flexion: 121 (Limited by \"tighness\"/calf muscle edema today) degrees   Extension: 5 degrees     Mobility   Patellar Mobility:   Left Knee   WFL: medial, lateral, superior and inferior  Right Knee   WFL: medial, lateral, superior and inferior    Patellar Mobility Comments   Left lateral tendon comments: *pain w/ palpation to lateral knee joint       Strength/Myotome Testing     Left Knee   Flexion: 5  Extension: 5  Quadriceps contraction: good (*pain)    Right Knee   Flexion: 5  Extension: 5  Quadriceps contraction: good    Additional Strength Details  Hip Strength (L/R):   Flexion (assessed in supine via SLR):  4+/5    Abduction: (assessed in s/l): 4+/5  ER (assessed in s/l): 5/5      Tests     Additional Tests Details  Palpation:  L knee: Tenderness w/ palpation to medial knee joint and lateral to patella  R knee: Tenderness w/ palpation to medial knee joint    Knee Special Tests (L/R- assessed at IE):  Lachman: negative/negative  Reverse Lachman: negative/negative  Varus/valgus Stress: negative/negative  Lyndsey: negative/negative    Stairs   Pt can ascend in " "reciprocating fashion and descends- sometimes does not have pain in L knee, but after several steps can feel L knee    Functional squat; improved        Daily Treatment Diary    EPOC: 7/26/23  Precautions: Recently dx'd w/ undifferentiated connective tissue disorder  Co- Morbidities: Recently dx'd w/ undifferentiated connective tissue disorder      Manual 6/8 6/12 6/26 6/28       B/L knee/patellar PROM    RB       k-tape patellar support [facility] ARON RB JH         TPR to distal ITB-L  RB                                   Exercise Diary 6/21 6/26 6/28 6/12 6/14 6/19   THEREX          Pt ed          Progress Note   RB + FOTO capture, discussion about going fwd       Recumbent Bike TM 6' 1 7-2 2 mph Bike 6' Bike 6'  6' TM 5' TM 6'   Gastrocs Stretch 30\"x3 ea b/l  30\"x3 ea b/l  30\"x3 ea b/l   30\"x3 ea b/l  30\"x3 ea b/l  30\"x3 ea b/l    HR/TR   30x HR       Active HS stretch w/ ankle pumps     HEP     Heel Slides          Supine ITB stretch     HEP     Standing ITB stretch          NEURO RE-ED          Quad sets w/ towel roll under knee          SLR flex/abd          Minisquats TRX depth to tolerance 10\"x10 TRX depth to tolerance 10\"x10   TRX depth to tolerance 10\"x10 TRX depth to tolerance 10\"x10 TRX depth to tolerance 10\"x10   Mini Lunge p!     Static lunge 6\" + foam 10x ea b/l  Static lunge 6\" + foam 10x ea b/l  Static lunge 4\" + foam 10x ea b/l    SLS cone reaches  High table w/ kick stand 3 cones 3x ea side    High table w/ kick stand 3 cones 3x ea side High table w/ kick stand 3 cones   TB resisted hip strengthening GTB 20x ea dir b/l     GTB 15x ea dir b/l  GTB 20x ea dir b/l  GTB 20x ea dir b/l    TB resisted sidestepping GTB 3 laps- ankle    GTB 2 laps- ankle GTB 3 laps- ankle GTB 3 laps- ankle   SLS KB pass 3# 10x ea side kick stand 3# 10x ea side kick stand    3# 10x ea side kick stand 3# 10x ea side kick stand   THERAPEUTIC ACTIVITY          Step ups     Up and Over 8\" R  10# 10x; attempted 4\" 10# w/ L, but pain; 4\" no " "weight w/ manual TB resist for L hip ER x 10 Up and Over 8\" R  10# 10x; attempted 4\" 10# w/ L, but pain; 4\" no weight w/ manual TB resist for L hip ER x 10 Up and Over 8\" R  10# 10x; attempted 4\" 10# w/ L, but pain; 4\" no weight w/ manual TB resist for L hip ER x 10   Resisted walking over hurdles Fwd 12# lat 8# 3 laps ea  Fwd 12# lat 8# 5 laps ea no hurdles        Box lifts           Box carry          deadlift           TRX squats to 12\" box 10x + 2\" box          Gait Training                                Modalities 4/12  4/17  4/24  5/1 5/3  5/8  5/17  5/24  5/31 6/12       CP PRN Home PRB  10'  10'  10' 10'  10'  10'  10' 10'  np                                                                                                           "

## 2023-07-03 ENCOUNTER — OFFICE VISIT (OUTPATIENT)
Dept: PHYSICAL THERAPY | Facility: CLINIC | Age: 48
End: 2023-07-03
Payer: COMMERCIAL

## 2023-07-03 DIAGNOSIS — M25.561 RIGHT KNEE PAIN, UNSPECIFIED CHRONICITY: Primary | ICD-10-CM

## 2023-07-03 DIAGNOSIS — M25.562 LEFT KNEE PAIN, UNSPECIFIED CHRONICITY: ICD-10-CM

## 2023-07-03 PROCEDURE — 97112 NEUROMUSCULAR REEDUCATION: CPT

## 2023-07-03 PROCEDURE — 97110 THERAPEUTIC EXERCISES: CPT

## 2023-07-03 PROCEDURE — 97530 THERAPEUTIC ACTIVITIES: CPT

## 2023-07-03 NOTE — PROGRESS NOTES
Daily Note     Today's date: 7/3/2023  Patient name: Kristin Mallory  : 1975  MRN: [id number]  Referring provider: Link Nichols MD  Dx:   Encounter Diagnosis     ICD-10-CM    1. Right knee pain, unspecified chronicity  M25.561       2. Left knee pain, unspecified chronicity  M25.562           Start Time: 0845  Stop Time: 0932  Total time in clinic (min): 47 minutes    Subjective: pt reports no new complaints. Objective: See treatment diary below      Assessment: Tolerated treatment well. Patient would benefit from continued PT. Continued w/ current POC as listed below w/ good tolerance. Improved form w/ box lifts/carries. Added incline on TM this session. Plan: Continue per plan of care. Daily Treatment Diary    EPOC: 23  Precautions: Recently dx'd w/ undifferentiated connective tissue disorder  Co- Morbidities: Recently dx'd w/ undifferentiated connective tissue disorder      Manual     B/L knee/patellar PROM RB RB        k-tape patellar support   [facility] ARON JH RB JH     TPR to distal ITB-L RB RB   RB                             Exercise Diary  7/3  6/12 6/14 6/19   THEREX          Pt ed          Progress Note          Recumbent Bike TM 6' 1.7-2.2 mph Bike 6' TM 3% incline 1.8 mph  6' TM 5' TM 6'   Gastrocs Stretch 30"x3 ea b/l  30"x3 ea b/l  30"x3 ea b/l   30"x3 ea b/l  30"x3 ea b/l  30"x3 ea b/l    NEURO RE-ED          Quad sets w/ towel roll under knee          SLR flex/abd          Minisquats TRX depth to tolerance 10"x10 TRX depth to tolerance 10"x10 TRX depth to tolerance 10"x10  TRX depth to tolerance 10"x10 TRX depth to tolerance 10"x10 TRX depth to tolerance 10"x10   Mini Lunge p!     Static lunge 6" + foam 10x ea b/l  Static lunge 6" + foam 10x ea b/l  Static lunge 4" + foam 10x ea b/l    SLS cone reaches      High table w/ kick stand 3 cones 3x ea side High table w/ kick stand 3 cones   TB resisted hip strengthening GTB 20x ea dir b/l   GTB 20x ea dir b/l   GTB 15x ea dir b/l  GTB 20x ea dir b/l  GTB 20x ea dir b/l    TB resisted sidestepping GTB 3 laps- ankle  GTB 4 laps- ankle  GTB 2 laps- ankle GTB 3 laps- ankle GTB 3 laps- ankle   SLS KB pass 3# 10x ea side kick stand 3# 10x ea side kick stand 3# 10x ea side kick stand   3# 10x ea side kick stand 3# 10x ea side kick stand   THERAPEUTIC ACTIVITY          Step ups     Up and Over 8" R  10# 10x; attempted 4" 10# w/ L, but pain; 4" no weight w/ manual TB resist for L hip ER x 10 Up and Over 8" R  10# 10x; attempted 4" 10# w/ L, but pain; 4" no weight w/ manual TB resist for L hip ER x 10 Up and Over 8" R  10# 10x; attempted 4" 10# w/ L, but pain; 4" no weight w/ manual TB resist for L hip ER x 10   Resisted walking over hurdles Fwd 12# lat 8# 3 laps ea  Fwd 12# lat 8# 5 laps ea no hurdles Fwd 12# lat 8# 5 laps ea no hurdles       Box lifts    Lift + carry 26# 3 laps       Box carry          deadlift           TRX squats to 12" box 10x + 2" box          Gait Training                                Modalities 4/12 4/17 4/24 5/1 5/3  5/8  5/17  5/24  5/31 6/12       CP PRN Home PRB  10'  10'  10' 10'  10'  10'  10' 10'  np

## 2023-07-18 ENCOUNTER — OFFICE VISIT (OUTPATIENT)
Dept: PHYSICAL THERAPY | Facility: CLINIC | Age: 48
End: 2023-07-18
Payer: COMMERCIAL

## 2023-07-18 DIAGNOSIS — M25.562 LEFT KNEE PAIN, UNSPECIFIED CHRONICITY: ICD-10-CM

## 2023-07-18 DIAGNOSIS — M25.561 RIGHT KNEE PAIN, UNSPECIFIED CHRONICITY: Primary | ICD-10-CM

## 2023-07-18 PROCEDURE — 97110 THERAPEUTIC EXERCISES: CPT

## 2023-07-18 PROCEDURE — 97112 NEUROMUSCULAR REEDUCATION: CPT

## 2023-07-18 PROCEDURE — 97140 MANUAL THERAPY 1/> REGIONS: CPT

## 2023-07-18 PROCEDURE — 97530 THERAPEUTIC ACTIVITIES: CPT

## 2023-07-18 NOTE — PROGRESS NOTES
Daily Note     Today's date: 2023  Patient name: Rene Herbert  : 1975  MRN: [id number]  Referring provider: Lázaro Fraga MD  Dx:   Encounter Diagnosis     ICD-10-CM    1. Right knee pain, unspecified chronicity  M25.561       2. Left knee pain, unspecified chronicity  M25.562           Start Time: 0806  Stop Time: 0846  Total time in clinic (min): 40 minutes    Subjective: Pt reports she returns from being at  camp for the past week. Notes her knees had held up pretty well and felt like she was able to perform squatting well if needed. Has been getting more L lateral knee pain, and is concerned because she leaves for another camping trip soon. Objective: See treatment diary below. Educated on use of foam roller/tennis ball against wall for self STM to L ITB. Assessment: Tolerated treatment well. Continued with program as outlined below. Pt very TTP with moderate increase of tone present along L mid-distal ITB, which did begin to relax with manual STM/TpR. Responded well to manual techniques, noting decrease in symptoms at L lat knee. Challenged with assigned exercises, but able to complete with no complaints of pain. Patient would benefit from continued PT       Plan: Continue per plan of care.       Daily Treatment Diary    EPOC: 23  Precautions: Recently dx'd w/ undifferentiated connective tissue disorder  Co- Morbidities: Recently dx'd w/ undifferentiated connective tissue disorder      Manual    B/L knee/patellar PROM RB RB        k-tape patellar support   [facility] ARON JH RB JH     TPR to distal ITB-L RB RB   RB  AFB                           Exercise Diary 6/21 6/26 7/3 7/18  6/14 6/19   THEREX          Pt ed    AFB      Progress Note          Recumbent Bike TM 6' 1.7-2.2 mph Bike 6' TM 3% incline 1.8 mph TM 3% incline 1.8 mph  5 min  TM 5' TM 6'   Gastrocs Stretch 30"x3 ea b/l  30"x3 ea b/l  30"x3 ea b/l    30"x3 ea b/l  30"x3 ea b/l NEURO RE-ED          Quad sets w/ towel roll under knee          SLR flex/abd          Minisquats TRX depth to tolerance 10"x10 TRX depth to tolerance 10"x10 TRX depth to tolerance 10"x10 TRX depth to tolerance 10"x10  TRX depth to tolerance 10"x10 TRX depth to tolerance 10"x10   Mini Lunge p!      Static lunge 6" + foam 10x ea b/l  Static lunge 4" + foam 10x ea b/l    SLS cone reaches      High table w/ kick stand 3 cones 3x ea side High table w/ kick stand 3 cones   TB resisted hip strengthening GTB 20x ea dir b/l   GTB 20x ea dir b/l    GTB 20x ea dir b/l  GTB 20x ea dir b/l    TB resisted sidestepping GTB 3 laps- ankle  GTB 4 laps- ankle   GTB 3 laps- ankle GTB 3 laps- ankle   SLS KB pass 3# 10x ea side kick stand 3# 10x ea side kick stand 3# 10x ea side kick stand 3# 10x ea side kick stand  3# 10x ea side kick stand 3# 10x ea side kick stand   THERAPEUTIC ACTIVITY          Step ups      Up and Over 8" R  10# 10x; attempted 4" 10# w/ L, but pain; 4" no weight w/ manual TB resist for L hip ER x 10 Up and Over 8" R  10# 10x; attempted 4" 10# w/ L, but pain; 4" no weight w/ manual TB resist for L hip ER x 10   Resisted walking over hurdles Fwd 12# lat 8# 3 laps ea  Fwd 12# lat 8# 5 laps ea no hurdles Fwd 12# lat 8# 5 laps ea no hurdles Fwd 12# lat 8# 5 laps ea no hurdles      Box lifts    Lift + carry 26# 3 laps Lift + carry 26# 3 laps      Box carry          deadlift           TRX squats to 12" box 10x + 2" box          Gait Training                                Modalities 4/12 4/17 4/24 5/1 5/3  5/8  5/17  5/24  5/31 6/12       CP PRN Home PRB  10'  10'  10' 10'  10'  10'  10' 10'  np

## 2023-07-24 ENCOUNTER — OFFICE VISIT (OUTPATIENT)
Dept: PHYSICAL THERAPY | Facility: CLINIC | Age: 48
End: 2023-07-24
Payer: COMMERCIAL

## 2023-07-24 DIAGNOSIS — M25.562 LEFT KNEE PAIN, UNSPECIFIED CHRONICITY: ICD-10-CM

## 2023-07-24 DIAGNOSIS — M25.561 RIGHT KNEE PAIN, UNSPECIFIED CHRONICITY: Primary | ICD-10-CM

## 2023-07-24 PROCEDURE — 97112 NEUROMUSCULAR REEDUCATION: CPT

## 2023-07-24 PROCEDURE — 97530 THERAPEUTIC ACTIVITIES: CPT

## 2023-07-24 PROCEDURE — 97110 THERAPEUTIC EXERCISES: CPT

## 2023-07-24 PROCEDURE — 97140 MANUAL THERAPY 1/> REGIONS: CPT

## 2023-07-24 NOTE — PROGRESS NOTES
Daily Note     Today's date: 2023  Patient name: Dave Up  : 1975  MRN: [id number]  Referring provider: Theo Ware MD  Dx:   Encounter Diagnosis     ICD-10-CM    1. Right knee pain, unspecified chronicity  M25.561       2. Left knee pain, unspecified chronicity  M25.562                      Subjective: pt reports no new complaints. Objective: See treatment diary below      Assessment: Tolerated treatment well. Patient would benefit from continued PT. Decreased R ITband post rolling. Tolerated increased resistance w/ resisted walking w/ good tolerance. Plan: Continue per plan of care. Daily Treatment Diary    EPOC: 23  Precautions: Recently dx'd w/ undifferentiated connective tissue disorder  Co- Morbidities: Recently dx'd w/ undifferentiated connective tissue disorder      Manual    B/L knee/patellar PROM          k-tape patellar support    [facility] ARON RB JH     TPR to distal ITB-L [facility] [place]    RB  AFB                           Exercise Diary 6/21 6/26 7/3 7/18 7/24     THEREX          Pt ed    AFB      Progress Note          Recumbent Bike TM 6' 1.7-2.2 mph Bike 6' TM 3% incline 1.8 mph TM 3% incline 1.8 mph  5 min TM 3% incline 1.8 mph  6 min     Gastrocs Stretch 30"x3 ea b/l  30"x3 ea b/l  30"x3 ea b/l   30"x3 ea b/l     NEURO RE-ED          Quad sets w/ towel roll under knee          SLR flex/abd          Minisquats TRX depth to tolerance 10"x10 TRX depth to tolerance 10"x10 TRX depth to tolerance 10"x10 TRX depth to tolerance 10"x10 TRX depth to tolerance 10"x10     Mini Lunge p!          SLS cone reaches          TB resisted hip strengthening GTB 20x ea dir b/l   GTB 20x ea dir b/l        TB resisted sidestepping GTB 3 laps- ankle  GTB 4 laps- ankle       SLS KB pass 3# 10x ea side kick stand 3# 10x ea side kick stand 3# 10x ea side kick stand 3# 10x ea side kick stand 4# 10x ea side kick stand     THERAPEUTIC ACTIVITY          Step ups Resisted walking over hurdles Fwd 12# lat 8# 3 laps ea  Fwd 12# lat 8# 5 laps ea no hurdles Fwd 12# lat 8# 5 laps ea no hurdles Fwd 12# lat 8# 5 laps ea no hurdles Fwd 14# lat 10# 5 laps ea no hurdles     Box lifts    Lift + carry 26# 3 laps Lift + carry 26# 3 laps Lift + carry 26# 3 laps     Box carry          deadlift           TRX squats to 12" box 10x + 2" box          Gait Training                                Modalities 4/12  4/17  4/24  5/1 5/3  5/8  5/17  5/24  5/31 6/12       CP PRN Home PRB  10'  10'  10' 10'  10'  10'  10' 10'  np O-L Flap Text: The defect edges were debeveled with a #15 scalpel blade.  Given the location of the defect, shape of the defect and the proximity to free margins an O-L flap was deemed most appropriate.  Using a sterile surgical marker, an appropriate advancement flap was drawn incorporating the defect and placing the expected incisions within the relaxed skin tension lines where possible.    The area thus outlined was incised deep to adipose tissue with a #15 scalpel blade.  The skin margins were undermined to an appropriate distance in all directions utilizing iris scissors.

## 2023-08-02 ENCOUNTER — OFFICE VISIT (OUTPATIENT)
Dept: PHYSICAL THERAPY | Facility: CLINIC | Age: 48
End: 2023-08-02
Payer: COMMERCIAL

## 2023-08-02 DIAGNOSIS — M25.562 LEFT KNEE PAIN, UNSPECIFIED CHRONICITY: ICD-10-CM

## 2023-08-02 DIAGNOSIS — M25.561 RIGHT KNEE PAIN, UNSPECIFIED CHRONICITY: Primary | ICD-10-CM

## 2023-08-02 PROCEDURE — 97110 THERAPEUTIC EXERCISES: CPT

## 2023-08-02 NOTE — LETTER
August 3, 2023    Silverio Feliciano MD  [facility] [address]    Patient: Gonzalo Oneill   YOB: 1975   Date of Visit: 2023     Encounter Diagnosis     ICD-10-CM    1. Right knee pain, unspecified chronicity  M25.561       2. Left knee pain, unspecified chronicity  M25.562           Dear Dr. Lisa Miguel: Thank you for your recent referral of Gonzalo Oneill. Please review the attached evaluation summary from Jaida's recent visit. Please verify that you agree with the plan of care by signing the attached order. If you have any questions or concerns, please do not hesitate to call. I sincerely appreciate the opportunity to share in the care of one of your patients and hope to have another opportunity to work with you in the near future. Sincerely,    Iraj Finney, PT      Referring Provider:      I certify that I have read the below Plan of Care and certify the need for these services furnished under this plan of treatment while under my care. Silverio Feliciano MD  [facility] [address]  Via Fax: [phone]             PT Discharge     Collection of Subjective/Objective data performed by Demetrio Whiting PTA. Assessment, POC, and Goal attainment performed by Jet Moran DPT. Today's date: 2023  Patient name: Gonzalo Oneill  : 1975  MRN: [id number]  Referring provider: Silverio Feliciano MD  Dx:   Encounter Diagnosis     ICD-10-CM    1. Right knee pain, unspecified chronicity  M25.561       2. Left knee pain, unspecified chronicity  M25.562           Start Time: 0805      Assessment  Assessment details: Gonzalo Oneill is a 46 y.o. female being treated as an outpatient to [facility] w/ initial c/o b/l knee pain.   Since previous progress note, pt has made gains in frequency of pain, Strength (via MMT and functional), and overall activity tolerance/function, and is ready for transition to updated HEP/d/c from skilled PT services at this time. Pt will be d/c'd from skilled PT services. Understanding of Dx/Px/POC: good   Prognosis: good    Goals  ST. Pain decreased by 25% in 4-6 weeks. -MET  2. ROM increased by 25% in 4-6 weeks. - MET  3. Strength increased by 1/2 to 1 muscle grade in all deficient muscle groups in 4-6 weeks. MET    LT. Decrease pain to 1-2/10 at worst by d/c.- MET  2. Increase ROM to [facility] for all deficient movements by d/c.-MET  3. Strength increased to 5 for all deficient muscle groups by d/c. MET  4. IADL performance increased to max function by d/c. MET  5. Recreational performance increased to max function by d/c. MET  6. Ambulation/stair climbing improved to max level of function by d/c. MET    Plan  Planned therapy interventions: home exercise program  Other planned therapy interventions: other interventions PRN  Frequency: d/c from skilled PT services; transition to upated HEP. Plan of Care beginning date: 2023  Plan of Care expiration date: 2023  Treatment plan discussed with: patient        Subjective Evaluation    History of Present Illness  Mechanism of injury: CURRENT LEVEL (23)  Pt reports 90% improvement since previous starting therapy. Stated she doesn't have "joint pain" anymore except occasionally w/ descending stairs. Although this limits her slightly, she states it is a lot better since previous RE. Pt reports compliance w/ HEP. PREVOUS LEVEL (23)  Pt reports continued steady progress since previous progress note. She states that the "knee joint pain" that she initially came with has improved significantly. Although still limited, she adds that her balance has been improving overall. She continues to have pain and difficulty descending stairs (mostly due to LLE), but states that this ha also improved significantly since she now has times where she can descend in reciprocating fashion "without thinking" for a few steps.      Pt also reports that her endurance/fatigue seems to be improving w/ skilled PT tx- she can now tolerate 3000 more steps/day. PREVIOUS LEVEL (23)   Pt reports feeling "at least 25%" recovered at this time. She no longer has much pain at rest when lying down and states that she can tolerate more activity than she could at IE. Overall, she reports less frequently experiencing 4/10 pain. .   Since IE, pt can tolerate longer periods of walking/standing and has less difficulty/pain negotiating hills, walking over uneven terrain, and w/ functional squat. She notes that she has less difficulty ascending/descending stairs w/ RLE, but still has relatively the same difficulty w/ LLE. INITIAL LEVEL (23)  Pt reports to IE w/ c/o b/l knee pain. Pt reports that she "twisted left knee" doing yard work in 2022. She reports that she attempted to treat on her own using CP, self stretching, but pain did not resolve. She reports seeing orthopedic in 2022 and had injection at that time- states that it may have helped slightly, but not much. Pt reports that R knee started to become painful in November most likely due to compensation. .     Pt has had gel injections b/l in December    Pt had MRI done in early January- states that it demonstrated R bone marrow edema and meniscal tear. Pt reports that she was instructed to rest.  Since then, pain has improved, but pt still feels very limited in strength, mobility, and overall function. Pt also report recently being dx'd w/ undifferentiated connective tissue d/o in 2022. Patient Goals  Patient goal: "not feel like I'm going to reinjure knee every time I do something";  Negotiate stairs in reciprocating fashion w/o pain (Progressing)  Pain  Current pain ratin  At best pain ratin  At worst pain ratin  Location: B/l Knees  Relieving factors: medications (sitting, Mobic, Plaquenil (for connective tissue d/o))  Exacerbated by: descending stairs (L), walking along uneven terrain,  transition from sit --> stand after extended periods of sitting > 10-15 minutes. 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 Right Knee   WFL: medial, lateral, superior and inferior    Patellar Mobility Comments   Left lateral tendon comments: *pain w/ palpation to lateral knee joint.      Strength/Myotome Testing     Left Knee   Flexion: 5  Extension: 5  Quadriceps contraction: good (*pain)    Right Knee   Flexion: 5  Extension: 5  Quadriceps contraction: good    Additional Strength Details  Hip Strength (L/R):   Flexion (assessed in supine via SLR):  5/5    Abduction: (assessed in s/l): 5/5  ER (assessed in s/l): 5/5      Tests     Additional Tests Details  Stairs   Pt can ascend in reciprocating fashion and descends- sometimes does not have pain in L knee, but after several steps can feel L knee -improved     Functional squat; improved        Daily Treatment Diary    EPOC: 6/28/23  Precautions: Recently dx'd w/ undifferentiated connective tissue disorder  Co- Morbidities: Recently dx'd w/ undifferentiated connective tissue disorder      Manual 7/24 6/8 6/12 6/26 7/18   B/L knee/patellar PROM          k-tape patellar support    [facility] RB JH     TPR to distal ITB-L [facility] [place]  AFB                           Exercise Diary 6/21 6/26 7/3 7/18 7/24 8/2    THEREX          Pt ed    AFB      Progress Note      FOTO; Measurements    Recumbent Bike TM 6' 1.7-2.2 mph Bike 6' TM 3% incline 1.8 mph TM 3% incline 1.8 mph  5 min TM 3% incline 1.8 mph  6 min TM 3% incline 1.8 mph  6 min    Gastrocs Stretch 30"x3 ea b/l  30"x3 ea b/l  30"x3 ea b/l   30"x3 ea b/l 30"x3 ea b/l    NEURO RE-ED          Quad sets w/ towel roll under knee          SLR flex/abd          Minisquats TRX depth to tolerance 10"x10 TRX depth to tolerance 10"x10 TRX depth to tolerance 10"x10 TRX depth to tolerance 10"x10 TRX depth to tolerance 10"x10     Mini Lunge p! SLS cone reaches          TB resisted hip strengthening GTB 20x ea dir b/l   GTB 20x ea dir b/l        TB resisted sidestepping GTB 3 laps- ankle  GTB 4 laps- ankle       SLS KB pass 3# 10x ea side kick stand 3# 10x ea side kick stand 3# 10x ea side kick stand 3# 10x ea side kick stand 4# 10x ea side kick stand     THERAPEUTIC ACTIVITY          Step ups          Resisted walking over hurdles Fwd 12# lat 8# 3 laps ea  Fwd 12# lat 8# 5 laps ea no hurdles Fwd 12# lat 8# 5 laps ea no hurdles Fwd 12# lat 8# 5 laps ea no hurdles Fwd 14# lat 10# 5 laps ea no hurdles     Box lifts    Lift + carry 26# 3 laps Lift + carry 26# 3 laps Lift + carry 26# 3 laps     Box carry          deadlift           TRX squats to 12" box 10x + 2" box          Gait Training                                Modalities 4/12 4/17 4/24 5/1 5/3  5/8  5/17  5/24  5/31 6/12       CP PRN Home PRB  10'  10'  10' 10'  10'  10'  10' 10'  np                                                                                             Attestation signed by Jose Simmons, PT at 8/2/2023 11:12 PM:  I supervised the visit. We discussed the case to ensure appropriate continuation and progression of care and I reviewed the documentation.

## 2023-08-02 NOTE — PROGRESS NOTES
PT Discharge     Collection of Subjective/Objective data performed by Sayda Lau PTA. Assessment, POC, and Goal attainment performed by Mary Morse DPT. Today's date: 2023  Patient name: Kristin Mallory  : 1975  MRN: [id number]  Referring provider: Link Nichols MD  Dx:   Encounter Diagnosis     ICD-10-CM    1. Right knee pain, unspecified chronicity  M25.561       2. Left knee pain, unspecified chronicity  M25.562           Start Time: 805      Assessment  Assessment details: Kristin Mallory is a 46 y.o. female being treated as an outpatient to [facility] PT w/ initial c/o b/l knee pain. Since previous progress note, pt has made gains in frequency of pain, Strength (via MMT and functional), and overall activity tolerance/function, and is ready for transition to updated HEP/d/c from skilled PT services at this time. Pt will be d/c'd from skilled PT services. Understanding of Dx/Px/POC: good   Prognosis: good    Goals  ST. Pain decreased by 25% in 4-6 weeks. -MET  2. ROM increased by 25% in 4-6 weeks. - MET  3. Strength increased by 1/2 to 1 muscle grade in all deficient muscle groups in 4-6 weeks. MET    LT. Decrease pain to 1-2/10 at worst by d/c.- MET  2. Increase ROM to [facility] for all deficient movements by d/c.-MET  3. Strength increased to 5 for all deficient muscle groups by d/c. MET  4. IADL performance increased to max function by d/c. MET  5. Recreational performance increased to max function by d/c. MET  6. Ambulation/stair climbing improved to max level of function by d/c. MET    Plan  Planned therapy interventions: home exercise program  Other planned therapy interventions: other interventions PRN  Frequency: d/c from skilled PT services; transition to upated HEP.   Plan of Care beginning date: 2023  Plan of Care expiration date: 2023  Treatment plan discussed with: patient        Subjective Evaluation    History of Present Illness  Mechanism of injury: CURRENT LEVEL (8/2/23)  Pt reports 90% improvement since previous starting therapy. Stated she doesn't have "joint pain" anymore except occasionally w/ descending stairs. Although this limits her slightly, she states it is a lot better since previous RE. Pt reports compliance w/ HEP. PREVOUS LEVEL (6/28/23)  Pt reports continued steady progress since previous progress note. She states that the "knee joint pain" that she initially came with has improved significantly. Although still limited, she adds that her balance has been improving overall. She continues to have pain and difficulty descending stairs (mostly due to LLE), but states that this ha also improved significantly since she now has times where she can descend in reciprocating fashion "without thinking" for a few steps. Pt also reports that her endurance/fatigue seems to be improving w/ skilled PT tx- she can now tolerate 3000 more steps/day. PREVIOUS LEVEL (5/31/23)   Pt reports feeling "at least 25%" recovered at this time. She no longer has much pain at rest when lying down and states that she can tolerate more activity than she could at IE. Overall, she reports less frequently experiencing 4/10 pain. .   Since IE, pt can tolerate longer periods of walking/standing and has less difficulty/pain negotiating hills, walking over uneven terrain, and w/ functional squat. She notes that she has less difficulty ascending/descending stairs w/ RLE, but still has relatively the same difficulty w/ LLE. INITIAL LEVEL (4/12/23)  Pt reports to IE w/ c/o b/l knee pain. Pt reports that she "twisted left knee" doing yard work in June of 2022. She reports that she attempted to treat on her own using CP, self stretching, but pain did not resolve. She reports seeing orthopedic in August of 2022 and had injection at that time- states that it may have helped slightly, but not much.  Pt reports that R knee started to become painful in November most likely due to compensation. .     Pt has had gel injections b/l in December    Pt had MRI done in early January- states that it demonstrated R bone marrow edema and meniscal tear. Pt reports that she was instructed to rest.  Since then, pain has improved, but pt still feels very limited in strength, mobility, and overall function. Pt also report recently being dx'd w/ undifferentiated connective tissue d/o in 2022. Patient Goals  Patient goal: "not feel like I'm going to reinjure knee every time I do something"; Negotiate stairs in reciprocating fashion w/o pain (Progressing)  Pain  Current pain ratin  At best pain ratin  At worst pain ratin  Location: B/l Knees  Relieving factors: medications (sitting, Mobic, Plaquenil (for connective tissue d/o))  Exacerbated by: descending stairs (L), walking along uneven terrain,  transition from sit --> stand after extended periods of sitting > 10-15 minutes. 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 Right Knee   WFL: medial, lateral, superior and inferior    Patellar Mobility Comments   Left lateral tendon comments: *pain w/ palpation to lateral knee joint.      Strength/Myotome Testing     Left Knee   Flexion: 5  Extension: 5  Quadriceps contraction: good (*pain)    Right Knee   Flexion: 5  Extension: 5  Quadriceps contraction: good    Additional Strength Details  Hip Strength (L/R):   Flexion (assessed in supine via SLR):  5/5    Abduction: (assessed in s/l): 5/5  ER (assessed in s/l): 5/5      Tests     Additional Tests Details  Stairs   Pt can ascend in reciprocating fashion and descends- sometimes does not have pain in L knee, but after several steps can feel L knee -improved     Functional squat; improved        Daily Treatment Diary    EPOC: 6/28/23  Precautions: Recently dx'd w/ undifferentiated connective tissue disorder  Co- Morbidities: Recently dx'd w/ undifferentiated connective tissue disorder      Manual 7/24 6/8 6/12 6/26 7/18   B/L knee/patellar PROM          k-tape patellar support    ROSSbuySAFE RB JH     TPR to distal ITB-L ROSSbuySAFE roller    RB  AFB                           Exercise Diary 6/21 6/26 7/3 7/18 7/24 8/2    THEREX          Pt ed    AFB      Progress Note      FOTO; Measurements    Recumbent Bike TM 6' 1.7-2.2 mph Bike 6' TM 3% incline 1.8 mph TM 3% incline 1.8 mph  5 min TM 3% incline 1.8 mph  6 min TM 3% incline 1.8 mph  6 min    Gastrocs Stretch 30"x3 ea b/l  30"x3 ea b/l  30"x3 ea b/l   30"x3 ea b/l 30"x3 ea b/l    NEURO RE-ED          Quad sets w/ towel roll under knee          SLR flex/abd          Minisquats TRX depth to tolerance 10"x10 TRX depth to tolerance 10"x10 TRX depth to tolerance 10"x10 TRX depth to tolerance 10"x10 TRX depth to tolerance 10"x10     Mini Lunge p!          SLS cone reaches          TB resisted hip strengthening GTB 20x ea dir b/l   GTB 20x ea dir b/l        TB resisted sidestepping GTB 3 laps- ankle  GTB 4 laps- ankle       SLS KB pass 3# 10x ea side kick stand 3# 10x ea side kick stand 3# 10x ea side kick stand 3# 10x ea side kick stand 4# 10x ea side kick stand     THERAPEUTIC ACTIVITY          Step ups          Resisted walking over hurdles Fwd 12# lat 8# 3 laps ea  Fwd 12# lat 8# 5 laps ea no hurdles Fwd 12# lat 8# 5 laps ea no hurdles Fwd 12# lat 8# 5 laps ea no hurdles Fwd 14# lat 10# 5 laps ea no hurdles     Box lifts    Lift + carry 26# 3 laps Lift + carry 26# 3 laps Lift + carry 26# 3 laps     Box carry          deadlift           TRX squats to 12" box 10x + 2" box          Gait Training                                Modalities 4/12  4/17  4/24  5/1 5/3  5/8  5/17  5/24  5/31 6/12       CP PRN Home PRB  10'  10'  10' 10'  10'  10'  10' 10'  np

## 2023-08-16 ENCOUNTER — EVALUATION (OUTPATIENT)
Dept: PHYSICAL THERAPY | Facility: CLINIC | Age: 48
End: 2023-08-16
Payer: COMMERCIAL

## 2023-08-16 DIAGNOSIS — M25.562 LEFT KNEE PAIN, UNSPECIFIED CHRONICITY: ICD-10-CM

## 2023-08-16 DIAGNOSIS — M25.561 RIGHT KNEE PAIN, UNSPECIFIED CHRONICITY: Primary | ICD-10-CM

## 2023-08-16 PROCEDURE — 97161 PT EVAL LOW COMPLEX 20 MIN: CPT | Performed by: PHYSICAL THERAPIST

## 2023-08-16 PROCEDURE — 97110 THERAPEUTIC EXERCISES: CPT | Performed by: PHYSICAL THERAPIST

## 2023-08-16 NOTE — PROGRESS NOTES
PT Evaluation     Today's date: 2023  Patient name: Bernadette Alberto  : 1975  MRN: [id number]  Referring provider: Milagro Luna, *  Dx:   Encounter Diagnosis     ICD-10-CM    1. Right knee pain, unspecified chronicity  M25.561       2. Left knee pain, unspecified chronicity  M25.562                      Assessment  Assessment details: Bernadette Alberto is a 46 y.o. female presenting as an outpatient to [facility] w/ c/o b/l LE lymphedema. Pt also presents w/ pain and decreased overall function related to this condition. Pt will benefit from skilled PT services to address the above deficits in order to max function to allow pt to achieve goals in PT. Thank you for the referral of this pt. Impairments: activity intolerance, impaired physical strength, lacks appropriate home exercise program and pain with function  Other impairment: LE lymphedema  Understanding of Dx/Px/POC: good   Prognosis: good    Goals  ST. Pain decreased by 25% in 4 weeks. 2. Edema decreased by 2-4 cm in 4 weeks. 3. Pt will be independent w/ initial HEP in 1-2 visits. LT. Decrease pain to 1-2/10 at worst by d/c.  2. Edema decreased by 5-10 cm in by d/c.   3. Pt will be independent w/ self management of symptoms and comprehensive HEP by d/c. Plan  Planned modality interventions: cryotherapy and thermotherapy: hydrocollator packs  Other planned modality interventions: other modalities PRN  Planned therapy interventions: flexibility, functional ROM exercises, graded exercise, home exercise program, manual therapy, neuromuscular re-education, patient education, strengthening, therapeutic exercise, therapeutic activities, transfer training and massage  Other planned therapy interventions: other interventions PRN  Frequency: 1-2x/week.   Duration in weeks: 6  Plan of Care beginning date: 2023  Plan of Care expiration date: 2023  Treatment plan discussed with: patient        Subjective Evaluation    History of Present Illness  Mechanism of injury: Pt reports to IE w/ c/o b/l LE lymphedema which began   She believes it started during her first pregnancy (approx 19 years ago) and has been intermittent since then, but has most recently been progressively worsening. She reports that she developed a significant amount of edema while recently on vacation in the [place] on Monday, . She reports that she does not do well in the heat. She wears compression socks intermittently, but was unable while on vacation due to being in/out of the water so frequently. Pt reports that edema adds to "heaviness"/exhaustion w/ stair negotiation, excessive walking. Recent dopplers negative b/l    Pt has not been prescribed diuretics ever. Patient Goals  Patient goals for therapy: decreased edema  Patient goal: Learn self-management  Pain  Current pain ratin  At worst pain ratin  Location: b/l LE  Quality: aching, uncomfortable. Alleviating factors: elevation. Exacerbated by: gravity dependent b/l LE for extended periods of time (sititng, walking, standing); heat/humidity. 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)        Objective     General Comments: Ankle/Foot Comments   Observation: Edema in b/l LE w/ circumferential girth measurements as below (L/R):    20 cm proximal to knee: 79 cm/79.5 cm  10 cm proximal to knee:  71 cm/77.5 cm  Knee Joint: 51.5 cm/56 cm  30 cm proximal to malleoli:  55.5 cm/57 cm  20 cm proximal to malleoli:  49 cm/49 cm  10 cm proximal to malleoli: 39.5 cm /40.5 cm  Malleoli: 32 cm/32 cm  Midfoot:  27.5 cm/29 cm  MTP: 27.5 cm/26.5 cm    No erythema in b/l LE except for min erythema surrounding bug bite on dorsum of L foot; small bug bites t/o b/l LE but no s/s of infection    LE Strength (L/R):    Hip Flexion: 4+ /4+  Knee Flexion: 5/5  Knee Extension: 5/5   Ankle DF: 5/5  Ankle PF:  5/5      Daily Treatment Diary    EPOC: 9/13/23  Precautions: Recently dx'd w/ undifferentiated connective tissue disorder  Co- Morbidities: Recently dx'd w/ undifferentiated connective tissue disorder      Manual  8/16                     MLD                                                                                                                        Exercise Diary 8/16                     THEREX             Pt ed 8' HEP instruct/handout                         Recumbent Bike                       Ankle pumps- elevated             Gastrocs Stretch                       HR/TR                       HS curls             Marches             Active HS stretch w/ ankle pumps             Heel Slides                                       NEURO RE-ED                                                                                                                        Gait Training                                                                       Modalities

## 2023-08-16 NOTE — LETTER
2023    Manish Perez MD  [address] [place] [address]    Patient: Abdulkadir Delcid   YOB: 1975   Date of Visit: 2023     Encounter Diagnosis     ICD-10-CM    1. Right knee pain, unspecified chronicity  M25.561       2. Left knee pain, unspecified chronicity  M25.562           Dear Dr. Raiv Meade:    Thank you for your recent referral of Abdulkadir Delcid. Please review the attached evaluation summary from Jaida's recent visit. Please verify that you agree with the plan of care by signing the attached order. If you have any questions or concerns, please do not hesitate to call. I sincerely appreciate the opportunity to share in the care of one of your patients and hope to have another opportunity to work with you in the near future. Sincerely,    Iraj Finney, PT      Referring Provider:      I certify that I have read the below Plan of Care and certify the need for these services furnished under this plan of treatment while under my care. Manish Perez MD  [address] [place] [address]  Via Fax: [phone]          PT Evaluation     Today's date: 2023  Patient name: Abdulkadir Delcid  : 1975  MRN: [id number]  Referring provider: Manish Perez, *  Dx:   Encounter Diagnosis     ICD-10-CM    1. Right knee pain, unspecified chronicity  M25.561       2. Left knee pain, unspecified chronicity  M25.562                      Assessment  Assessment details: Abdulkadir Delcid is a 46 y.o. female presenting as an outpatient to [facility] PT w/ c/o b/l LE lymphedema. Pt also presents w/ pain and decreased overall function related to this condition. Pt will benefit from skilled PT services to address the above deficits in order to max function to allow pt to achieve goals in PT. Thank you for the referral of this pt.      Impairments: activity intolerance, impaired physical strength, lacks appropriate home exercise program and pain with function  Other impairment: LE lymphedema  Understanding of Dx/Px/POC: good   Prognosis: good    Goals  ST. Pain decreased by 25% in 4 weeks. 2. Edema decreased by 2-4 cm in 4 weeks. 3. Pt will be independent w/ initial HEP in 1-2 visits. LT. Decrease pain to 1-2/10 at worst by d/c.  2. Edema decreased by 5-10 cm in by d/c.   3. Pt will be independent w/ self management of symptoms and comprehensive HEP by d/c. Plan  Planned modality interventions: cryotherapy and thermotherapy: hydrocollator packs  Other planned modality interventions: other modalities PRN  Planned therapy interventions: flexibility, functional ROM exercises, graded exercise, home exercise program, manual therapy, neuromuscular re-education, patient education, strengthening, therapeutic exercise, therapeutic activities, transfer training and massage  Other planned therapy interventions: other interventions PRN  Frequency: 1-2x/week. Duration in weeks: 6  Plan of Care beginning date: 2023  Plan of Care expiration date: 2023  Treatment plan discussed with: patient        Subjective Evaluation    History of Present Illness  Mechanism of injury: Pt reports to IE w/ c/o b/l LE lymphedema which began   She believes it started during her first pregnancy (approx 19 years ago) and has been intermittent since then, but has most recently been progressively worsening. She reports that she developed a significant amount of edema while recently on vacation in the [place] on Monday, . She reports that she does not do well in the heat. She wears compression socks intermittently, but was unable while on vacation due to being in/out of the water so frequently. Pt reports that edema adds to "heaviness"/exhaustion w/ stair negotiation, excessive walking. Recent dopplers negative b/l    Pt has not been prescribed diuretics ever.    Patient Goals  Patient goals for therapy: decreased edema  Patient goal: Learn self-management  Pain  Current pain ratin  At worst pain ratin  Location: b/l LE  Quality: aching, uncomfortable. Alleviating factors: elevation. Exacerbated by: gravity dependent b/l LE for extended periods of time (sititng, walking, standing); heat/humidity. 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)        Objective     General Comments:       Ankle/Foot Comments   Observation: Edema in b/l LE w/ circumferential girth measurements as below (L/R):    20 cm proximal to knee: 79 cm/79.5 cm  10 cm proximal to knee:  71 cm/77.5 cm  Knee Joint: 51.5 cm/56 cm  30 cm proximal to malleoli:  55.5 cm/57 cm  20 cm proximal to malleoli:  49 cm/49 cm  10 cm proximal to malleoli: 39.5 cm /40.5 cm  Malleoli: 32 cm/32 cm  Midfoot:  27.5 cm/29 cm  MTP: 27.5 cm/26.5 cm    No erythema in b/l LE except for min erythema surrounding bug bite on dorsum of L foot; small bug bites t/o b/l LE but no s/s of infection    LE Strength (L/R):   Hip Flexion: 4+ /4+  Knee Flexion: 5/5  Knee Extension: 5/5   Ankle DF: 5/5  Ankle PF:  5/5      Daily Treatment Diary    EPOC: 23  Precautions: Recently dx'd w/ undifferentiated connective tissue disorder  Co- Morbidities: Recently dx'd w/ undifferentiated connective tissue disorder      Manual                       MLD                                                                                                                        Exercise Diary                      THEREX             Pt ed 8' HEP instruct/handout                         Recumbent Bike                       Ankle pumps- elevated             Gastrocs Stretch                       HR/TR                       HS curls             Marches             Active HS stretch w/ ankle pumps             Heel Slides NEURO RE-ED                                                                                                                        Gait Training                                                                       Modalities

## 2023-08-17 ENCOUNTER — OFFICE VISIT (OUTPATIENT)
Dept: PHYSICAL THERAPY | Facility: CLINIC | Age: 48
End: 2023-08-17
Payer: COMMERCIAL

## 2023-08-17 DIAGNOSIS — I89.0 LYMPHEDEMA: Primary | ICD-10-CM

## 2023-08-17 PROCEDURE — 97110 THERAPEUTIC EXERCISES: CPT

## 2023-08-17 PROCEDURE — 97140 MANUAL THERAPY 1/> REGIONS: CPT

## 2023-08-17 NOTE — PROGRESS NOTES
Daily Note     Today's date: 2023  Patient name: Erica Meneses  : 1975  MRN: [id number]  Referring provider: Eleazar Macias, *  Dx:   Encounter Diagnosis     ICD-10-CM    1. Lymphedema  I89.0           Start Time: 0850          Subjective: pt reports no new complaints upon arrival.       Objective: See treatment diary below      Assessment: Tolerated treatment well. Patient would benefit from continued PT. Explained lymphatic system and different treatment options. Educated pt on compression, elevation, and exercise. Decreased symptoms in LLE post MLD. Plan: Continue per plan of care.         Daily Treatment Diary    EPOC: 23  Precautions: Recently dx'd w/ undifferentiated connective tissue disorder  Co- Morbidities: Recently dx'd w/ undifferentiated connective tissue disorder      Manual                     MLD    30'                                                                                                                    Exercise Diary                    THEREX             Pt ed 8' HEP instruct/handout JH compression, elevation, exercise, lymphatic system                        Recumbent Bike                       Ankle pumps- elevated             Gastrocs Stretch                       HR/TR                       HS curls             Marches             Active HS stretch w/ ankle pumps             Heel Slides                                       NEURO RE-ED                                                                                                                        Gait Training                                                                       Modalities

## 2023-08-22 ENCOUNTER — OFFICE VISIT (OUTPATIENT)
Dept: PHYSICAL THERAPY | Facility: CLINIC | Age: 48
End: 2023-08-22
Payer: COMMERCIAL

## 2023-08-22 DIAGNOSIS — I89.0 LYMPHEDEMA: Primary | ICD-10-CM

## 2023-08-22 PROCEDURE — 97016 VASOPNEUMATIC DEVICE THERAPY: CPT

## 2023-08-22 PROCEDURE — 97110 THERAPEUTIC EXERCISES: CPT

## 2023-08-22 NOTE — PROGRESS NOTES
Daily Note     Today's date: 2023  Patient name: Ric Huff  : 1975  MRN: [id number]  Referring provider: Trish Evans, *  Dx:   Encounter Diagnosis     ICD-10-CM    1. Lymphedema  I89.0                      Subjective: pt reports legs are doing better but seems to have more swelling towards the end of the day. Objective: See treatment diary below      Assessment: Tolerated treatment well. Patient would benefit from continued PT. Discussed that it is normal to have more swelling towards end of day due to being on feet t/o the day. Discussed the benefits of compression pump and trialed pump w/ good tolerance. Educated pt on proper compression garments. Ordered pt Juzo Dynamic size V Max regular length, knee high. Plan: Continue per plan of care.       Daily Treatment Diary    EPOC: 23  Precautions: Recently dx'd w/ undifferentiated connective tissue disorder  Co- Morbidities: Recently dx'd w/ undifferentiated connective tissue disorder      Manual                   MLD    30'                                                                                                                    Exercise Diary                  THEREX             Pt ed 8' HEP instruct/handout JH compression, elevation, exercise, lymphatic system JH garment, pump                        Recumbent Bike                       Ankle pumps- elevated             Gastrocs Stretch                       HR/TR                       HS curls             Marches             Active HS stretch w/ ankle pumps             Heel Slides                                       NEURO RE-ED                                                                                                                        Gait Training                                                                       Modalities                      Compression garment Applied

## 2023-08-29 ENCOUNTER — OFFICE VISIT (OUTPATIENT)
Dept: PHYSICAL THERAPY | Facility: CLINIC | Age: 48
End: 2023-08-29
Payer: COMMERCIAL

## 2023-08-29 DIAGNOSIS — I89.0 LYMPHEDEMA: Primary | ICD-10-CM

## 2023-08-29 PROCEDURE — 97140 MANUAL THERAPY 1/> REGIONS: CPT

## 2023-08-29 NOTE — PROGRESS NOTES
Daily Note     Today's date: 2023  Patient name: Faisal Lay  : 1975  MRN: [id number]  Referring provider: Rebel Khan, *  Dx:   Encounter Diagnosis     ICD-10-CM    1. Lymphedema  I89.0                      Subjective: pt reports the swelling is getting better. Able to put on a pair of shoes she hasn't been able to for a while. Objective: See treatment diary below      Assessment: Tolerated treatment well. Patient would benefit from continued PT. Increased swelling in LLE noted, decreased post MLD. Plan: Continue per plan of care.       Daily Treatment Diary    EPOC: 23  Precautions: Recently dx'd w/ undifferentiated connective tissue disorder  Co- Morbidities: Recently dx'd w/ undifferentiated connective tissue disorder      Manual                 MLD    30'    38'                                                                                                                Exercise Diary                  THEREX             Pt ed 8' HEP instruct/handout JH compression, elevation, exercise, lymphatic system JH garment, pump                        Recumbent Bike                       Ankle pumps- elevated             Gastrocs Stretch                       HR/TR                       HS curls             Marches             Active HS stretch w/ ankle pumps             Heel Slides                                       NEURO RE-ED                                                                                                                        Gait Training                                                                       Modalities                      Compression garment Applied

## 2023-09-01 ENCOUNTER — OFFICE VISIT (OUTPATIENT)
Dept: PHYSICAL THERAPY | Facility: CLINIC | Age: 48
End: 2023-09-01
Payer: COMMERCIAL

## 2023-09-01 DIAGNOSIS — I89.0 LYMPHEDEMA: Primary | ICD-10-CM

## 2023-09-01 PROCEDURE — 97016 VASOPNEUMATIC DEVICE THERAPY: CPT

## 2023-09-01 PROCEDURE — 97110 THERAPEUTIC EXERCISES: CPT

## 2023-09-01 NOTE — PROGRESS NOTES
Daily Note     Today's date: 2023  Patient name: Alli Richter  : 1975  MRN: [id number]  Referring provider: Rachna Oneal, *  Dx:   Encounter Diagnosis     ICD-10-CM    1. Lymphedema  I89.0                      Subjective: pt reports her compression garments arrived in the mail. Objective: See treatment diary below      Assessment: Tolerated treatment well. Patient would benefit from continued PT. Compression garment fit well and pt demonstrated proper way of donning/doffing garment. Plan: Continue per plan of care.       Daily Treatment Diary    EPOC: 23  Precautions: Recently dx'd w/ undifferentiated connective tissue disorder  Co- Morbidities: Recently dx'd w/ undifferentiated connective tissue disorder      Manual                 MLD    30'    38'                                                                                                                Exercise Diary                THEREX             Pt ed 8' HEP instruct/handout JH compression, elevation, exercise, lymphatic system JH garment, pump  JH                      Recumbent Bike                       Ankle pumps- elevated             Gastrocs Stretch                       HR/TR                       HS curls             Marches             Active HS stretch w/ ankle pumps             Heel Slides                                       NEURO RE-ED                                                                                                                        Gait Training                                                                       Modalities                    Compression garment Applied  Applied

## 2023-09-05 ENCOUNTER — OFFICE VISIT (OUTPATIENT)
Dept: PHYSICAL THERAPY | Facility: CLINIC | Age: 48
End: 2023-09-05
Payer: COMMERCIAL

## 2023-09-05 DIAGNOSIS — I89.0 LYMPHEDEMA: Primary | ICD-10-CM

## 2023-09-05 PROCEDURE — 97140 MANUAL THERAPY 1/> REGIONS: CPT

## 2023-09-05 NOTE — PROGRESS NOTES
Daily Note     Today's date: 2023  Patient name: Nacho Kirk  : 1975  MRN: [id number]  Referring provider: Shannon Tapia, *  Dx:   Encounter Diagnosis     ICD-10-CM    1. Lymphedema  I89.0           Start Time: 0800          Subjective: pt reports the compression garments are working good so far. Objective: See treatment diary below      Assessment: Tolerated treatment well. Patient would benefit from continued PT. Performed MLD to b/l LE to improve lymphatic flow and decrease swelling. Plan: Continue per plan of care.       Daily Treatment Diary    EPOC: 23  Precautions: Recently dx'd w/ undifferentiated connective tissue disorder  Co- Morbidities: Recently dx'd w/ undifferentiated connective tissue disorder      Manual               MLD    30'    38'  42'                                                                                                              Exercise Diary                THEREX             Pt ed 8' HEP instruct/handout JH compression, elevation, exercise, lymphatic system JH garment, pump  JH                      Recumbent Bike                       Ankle pumps- elevated             Gastrocs Stretch                       HR/TR                       HS curls             Marches             Active HS stretch w/ ankle pumps             Heel Slides                                       NEURO RE-ED                                                                                                                        Gait Training                                                                       Modalities                    Compression garment Applied  Applied

## 2023-09-07 ENCOUNTER — OFFICE VISIT (OUTPATIENT)
Dept: PHYSICAL THERAPY | Facility: CLINIC | Age: 48
End: 2023-09-07
Payer: COMMERCIAL

## 2023-09-07 DIAGNOSIS — I89.0 LYMPHEDEMA: Primary | ICD-10-CM

## 2023-09-07 PROCEDURE — 97110 THERAPEUTIC EXERCISES: CPT

## 2023-09-07 PROCEDURE — 97016 VASOPNEUMATIC DEVICE THERAPY: CPT

## 2023-09-07 NOTE — PROGRESS NOTES
Daily Note     Today's date: 2023  Patient name: Liat Harry  : 1975  MRN: [id number]  Referring provider: Rosalio Hatchet, *  Dx:   Encounter Diagnosis     ICD-10-CM    1. Lymphedema  I89.0                      Subjective: pt reports some improvement in the legs. Reports she is able to get her shoes on a little better. Notes she has been complaint w/ wearing her compression garments. Objective: See treatment diary below      Assessment: Tolerated treatment well. Patient would benefit from continued PT. Educated pt on compression pump, elevation, compression, and exercise. Applied Compression pump to improve lymphatic flow and decrease swelling. Plan: Continue per plan of care.       Daily Treatment Diary    EPOC: 23  Precautions: Recently dx'd w/ undifferentiated connective tissue disorder  Co- Morbidities: Recently dx'd w/ undifferentiated connective tissue disorder      Manual    9/             MLD    30'    38'  42'                                                                                                              Exercise Diary              THEREX             Pt ed 8' HEP instruct/handout  compression, elevation, exercise, lymphatic system [facility] ARON garment, pump  [facility]                     Recumbent Bike                       Ankle pumps- elevated             Gastrocs Stretch                       HR/TR                       HS curls             Marches             Active HS stretch w/ ankle pumps             Heel Slides                                       NEURO RE-ED                                                                                                                        Gait Training                                                                       Modalities                  Compression pump Applied  [facility]

## 2023-09-12 ENCOUNTER — OFFICE VISIT (OUTPATIENT)
Dept: PHYSICAL THERAPY | Facility: CLINIC | Age: 48
End: 2023-09-12
Payer: COMMERCIAL

## 2023-09-12 DIAGNOSIS — I89.0 LYMPHEDEMA: Primary | ICD-10-CM

## 2023-09-12 PROCEDURE — 97140 MANUAL THERAPY 1/> REGIONS: CPT

## 2023-09-12 PROCEDURE — 97110 THERAPEUTIC EXERCISES: CPT

## 2023-09-12 NOTE — PROGRESS NOTES
PT Re-Evaluation      Collection of Subjective/Objective data performed by Meenakshi Menjivar PTA. Assessment, POC, and Goal attainment performed by Du Cid DPT. Today's date: 2023  Patient name: Osito Velazquez  : 1975  MRN: [id number]  Referring provider: Fab Guerrero, *  Dx:   Encounter Diagnosis     ICD-10-CM    1. Lymphedema  I89.0           Start Time: 0800  Stop Time: 0900  Total time in clinic (min): 60 minutes    Assessment  Assessment details: Patient is a 46 y.o. female who carries the diagnosis of c/o b/l LE lymphedema and has completed 8 visits to date. Patient demonstrates notable subjective/objective improvement since enrolling in PT to include improving function and girth measurements compared to IE. Pt is still awaiting compression pumps to be approved by her insurance. Patient continues to exhibit lingering deficits to include increased bilateral edema that continues to impact tolerance/ability to perform ADLs and recreational activities. Patient will benefit from continued skilled PT intervention to address the aforementioned impairments, achieve goals, maximize function, and improve quality of life. Pt is in agreement with this plan. Impairments: activity intolerance, impaired physical strength, lacks appropriate home exercise program and pain with function  Other impairment: LE lymphedema  Understanding of Dx/Px/POC: good   Prognosis: good    Goals  ST. Pain decreased by 25% in 4 weeks. --met  2. Edema decreased by 2-4 cm in 4 weeks. --met   3. Pt will be independent w/ initial HEP in 1-2 visits. --met    LT. Decrease pain to 1-2/10 at worst by d/c. --progressing  2. Edema decreased by 5-10 cm in by d/c. --progressing  3. Pt will be independent w/ self management of symptoms and comprehensive HEP by d/c. --progressing    Plan  Plan details: Cont to tx per POC  Patient would benefit from: skilled physical therapy and PT eval  Planned modality interventions: cryotherapy and thermotherapy: hydrocollator packs  Other planned modality interventions: other modalities PRN  Planned therapy interventions: flexibility, functional ROM exercises, graded exercise, home exercise program, manual therapy, neuromuscular re-education, patient education, strengthening, therapeutic exercise, therapeutic activities, transfer training and massage  Other planned therapy interventions: other interventions PRN  Frequency: 2x week (1-2x/week)  Duration in weeks: 6  Plan of Care beginning date: 9/12/2023  Plan of Care expiration date: 10/24/2023  Treatment plan discussed with: patient        Subjective Evaluation    History of Present Illness  Mechanism of injury: CURRENT STATUS (9/12/23)  Pt reports steady progress towards her goals. Reports she feels that the swelling has improved some but it is hard to tell sometimes. Reports that stair negotiation is still challenging at times due to "heaviness." Pt notes she has been complaint in wearing the compression garments, which seems to be helping. Still waiting for compression pump to be approved. PREVIOUS STATUS (8/16/23)  Pt reports to IE w/ c/o b/l LE lymphedema which began   She believes it started during her first pregnancy (approx 19 years ago) and has been intermittent since then, but has most recently been progressively worsening. She reports that she developed a significant amount of edema while recently on vacation in the [place] on Monday, 8/14. She reports that she does not do well in the heat. She wears compression socks intermittently, but was unable while on vacation due to being in/out of the water so frequently. Pt reports that edema adds to "heaviness"/exhaustion w/ stair negotiation, excessive walking. Recent dopplers negative b/l    Pt has not been prescribed diuretics ever.    Patient Goals  Patient goals for therapy: decreased edema  Patient goal: Learn self-management  Pain  Current pain ratin  At worst pain rating: 3  Location: b/l LE  Quality: aching, uncomfortable. Alleviating factors: elevation. Exacerbated by: gravity dependent b/l LE for extended periods of time (sititng, walking, standing); heat/humidity. 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)        Objective     General Comments:       Ankle/Foot Comments   Observation: Edema in b/l LE w/ circumferential girth measurements as below (L/R):    20 cm proximal to knee: 77 cm/79.5 cm  10 cm proximal to knee:  68 cm/76 cm  Knee Joint: 51.5 cm/55 cm  30 cm proximal to malleoli:  55 cm/56 cm  20 cm proximal to malleoli:  47 cm/47 cm  10 cm proximal to malleoli: 39 cm /38 cm  Malleoli: 31 cm/31 cm  Midfoot:  26 cm/26 cm  MTP: 27.5 cm/26.5 cm    No erythema in b/l LE     LE Strength (L/R):   Hip Flexion: 4+ /4+  Knee Flexion: 5/5  Knee Extension: 5/5   Ankle DF: 5/5  Ankle PF:  5/5       Daily Treatment Diary    EPOC: 10/24/23; RE: 10/24/23  Precautions: Recently dx'd w/ undifferentiated connective tissue disorder  Co- Morbidities: Recently dx'd w/ undifferentiated connective tissue disorder    FOTO: 83; Goal: 83    Manual             MLD    30'    45'  42'  35'                                                                                                            Exercise Diary            THEREX             Pt ed 8' HEP instruct/handout  compression, elevation, exercise, lymphatic system [facility] ARON garment, pump  [facility] FOTO; Measurements                    Recumbent Bike                       Ankle pumps- elevated             Gastrocs Stretch                       HR/TR                       HS curls             Marches             Active HS stretch w/ ankle pumps             Heel Slides NEURO RE-ED                                                                                                                        Gait Training                                                                       Modalities 8/22 9/1 9/7                 Compression pump Applied  [facility]

## 2023-09-12 NOTE — LETTER
2023    Purnell Cushing, [address]    Patient: Kaleb Stratton   YOB: 1975   Date of Visit: 2023     Encounter Diagnosis     ICD-10-CM    1. Lymphedema  I89.0           Dear Dr. Nix Knife:    Thank you for your recent referral of Kaleb Stratton. Please review the attached evaluation summary from Jaida's recent visit. Please verify that you agree with the plan of care by signing the attached order. If you have any questions or concerns, please do not hesitate to call. I sincerely appreciate the opportunity to share in the care of one of your patients and hope to have another opportunity to work with you in the near future. Sincerely,    Giselle Garcia, PT      Referring Provider:      I certify that I have read the below Plan of Care and certify the need for these services furnished under this plan of treatment while under my care. Purnell Cushing, MD  [address] [place] [address]  Via Fax: [phone]          PT Re-Evaluation      Collection of Subjective/Objective data performed by Helen Jansen PTA. Assessment, POC, and Goal attainment performed by AVIVA MoyaT. Today's date: 2023  Patient name: Kaleb Stratton  : 1975  MRN: [id number]  Referring provider: Purnell Cushing, *  Dx:   Encounter Diagnosis     ICD-10-CM    1. Lymphedema  I89.0           Start Time: 0800  Stop Time: 0900  Total time in clinic (min): 60 minutes    Assessment  Assessment details: Patient is a 46 y.o. female who carries the diagnosis of c/o b/l LE lymphedema and has completed 8 visits to date. Patient demonstrates notable subjective/objective improvement since enrolling in PT to include improving function and girth measurements compared to IE. Pt is still awaiting compression pumps to be approved by her insurance.  Patient continues to exhibit lingering deficits to include increased bilateral edema that continues to impact tolerance/ability to perform ADLs and recreational activities. Patient will benefit from continued skilled PT intervention to address the aforementioned impairments, achieve goals, maximize function, and improve quality of life. Pt is in agreement with this plan. Impairments: activity intolerance, impaired physical strength, lacks appropriate home exercise program and pain with function  Other impairment: LE lymphedema  Understanding of Dx/Px/POC: good   Prognosis: good    Goals  ST. Pain decreased by 25% in 4 weeks. --met  2. Edema decreased by 2-4 cm in 4 weeks. --met   3. Pt will be independent w/ initial HEP in 1-2 visits. --met    LT. Decrease pain to 1-2/10 at worst by d/c. --progressing  2. Edema decreased by 5-10 cm in by d/c. --progressing  3. Pt will be independent w/ self management of symptoms and comprehensive HEP by d/c. --progressing    Plan  Plan details: Cont to tx per POC  Patient would benefit from: skilled physical therapy and PT eval  Planned modality interventions: cryotherapy and thermotherapy: hydrocollator packs  Other planned modality interventions: other modalities PRN  Planned therapy interventions: flexibility, functional ROM exercises, graded exercise, home exercise program, manual therapy, neuromuscular re-education, patient education, strengthening, therapeutic exercise, therapeutic activities, transfer training and massage  Other planned therapy interventions: other interventions PRN  Frequency: 2x week (1-2x/week)  Duration in weeks: 6  Plan of Care beginning date: 2023  Plan of Care expiration date: 10/24/2023  Treatment plan discussed with: patient        Subjective Evaluation    History of Present Illness  Mechanism of injury: CURRENT STATUS (23)  Pt reports steady progress towards her goals. Reports she feels that the swelling has improved some but it is hard to tell sometimes.  Reports that stair negotiation is still challenging at times due to "heaviness." Pt notes she has been complaint in wearing the compression garments, which seems to be helping. Still waiting for compression pump to be approved. PREVIOUS STATUS (23)  Pt reports to IE w/ c/o b/l LE lymphedema which began   She believes it started during her first pregnancy (approx 19 years ago) and has been intermittent since then, but has most recently been progressively worsening. She reports that she developed a significant amount of edema while recently on vacation in the [place] on Monday, . She reports that she does not do well in the heat. She wears compression socks intermittently, but was unable while on vacation due to being in/out of the water so frequently. Pt reports that edema adds to "heaviness"/exhaustion w/ stair negotiation, excessive walking. Recent dopplers negative b/l    Pt has not been prescribed diuretics ever. Patient Goals  Patient goals for therapy: decreased edema  Patient goal: Learn self-management  Pain  Current pain ratin  At worst pain rating: 3  Location: b/l LE  Quality: aching, uncomfortable. Alleviating factors: elevation. Exacerbated by: gravity dependent b/l LE for extended periods of time (sititng, walking, standing); heat/humidity. 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)        Objective     General Comments:       Ankle/Foot Comments   Observation: Edema in b/l LE w/ circumferential girth measurements as below (L/R):    20 cm proximal to knee: 77 cm/79.5 cm  10 cm proximal to knee:  68 cm/76 cm  Knee Joint: 51.5 cm/55 cm  30 cm proximal to malleoli:  55 cm/56 cm  20 cm proximal to malleoli:  47 cm/47 cm  10 cm proximal to malleoli: 39 cm /38 cm  Malleoli: 31 cm/31 cm  Midfoot:  26 cm/26 cm  MTP: 27.5 cm/26.5 cm    No erythema in b/l LE     LE Strength (L/R):   Hip Flexion: 4+ /4+  Knee Flexion: 5/5  Knee Extension: 5/5   Ankle DF: 5/5  Ankle PF:  5/5       Daily Treatment Diary    EPOC: 10/24/23; RE: 10/24/23  Precautions: Recently dx'd w/ undifferentiated connective tissue disorder  Co- Morbidities: Recently dx'd w/ undifferentiated connective tissue disorder    FOTO: 83; Goal: 83    Manual  8/16 8/17 8/22 8/29 9/5 9/12           MLD    30'    38'  42'  35'                                                                                                            Exercise Diary 8/16 8/17 8/22 9/1 9/7 9/12           THEREX             Pt ed 8' HEP instruct/handout  compression, elevation, exercise, lymphatic system [facility] ARON garment, pump  [facility] FOTO; Measurements                    Recumbent Bike                       Ankle pumps- elevated             Gastrocs Stretch                       HR/TR                       HS curls             Marches             Active HS stretch w/ ankle pumps             Heel Slides                                       NEURO RE-ED                                                                                                                        Gait Training                                                                       Modalities 8/22 9/1 9/7                 Compression pump Applied  Applied Applied                                                                           Attestation signed by Dyllan Montero PT at 9/12/2023 10:45 AM:  I supervised the visit. We discussed the case to ensure appropriate continuation and progression of care and I reviewed the documentation.

## 2023-09-13 ENCOUNTER — APPOINTMENT (OUTPATIENT)
Dept: PHYSICAL THERAPY | Facility: CLINIC | Age: 48
End: 2023-09-13
Payer: COMMERCIAL

## 2023-09-14 ENCOUNTER — APPOINTMENT (OUTPATIENT)
Dept: PHYSICAL THERAPY | Facility: CLINIC | Age: 48
End: 2023-09-14
Payer: COMMERCIAL

## 2023-10-03 ENCOUNTER — OFFICE VISIT (OUTPATIENT)
Dept: PHYSICAL THERAPY | Facility: CLINIC | Age: 48
End: 2023-10-03
Payer: COMMERCIAL

## 2023-10-03 DIAGNOSIS — I89.0 LYMPHEDEMA: Primary | ICD-10-CM

## 2023-10-03 PROCEDURE — 97110 THERAPEUTIC EXERCISES: CPT

## 2023-10-03 PROCEDURE — 97016 VASOPNEUMATIC DEVICE THERAPY: CPT

## 2023-10-03 NOTE — PROGRESS NOTES
Daily Note     Today's date: 10/3/2023  Patient name: Raiza Contreras  : 1975  MRN: [id number]  Referring provider: Henry Lazo, *  Dx:   Encounter Diagnosis     ICD-10-CM    1. Lymphedema  I89.0                      Subjective: pt reports her legs continue to have decreased swelling. Pt states her legs increase in swelling if not wearing the compression garments, therefore, has been compliant in wearing compression. Objective: See treatment diary below      Assessment: Tolerated treatment well. Patient would benefit from continued PT. Pt is compliant in wearing compression garments. Pt does have stage 2 lymphedema. Pt was educated on compression, elevation, and exercise. Applied compression pump today to help improve swelling and lymphatic flow. Pt would benefit from the [id number] at home to help maintain and manage lymphedema sxs long-term. Plan: Continue per plan of care.         Daily Treatment Diary    EPOC: 10/24/23; RE: 10/24/23  Precautions: Recently dx'd w/ undifferentiated connective tissue disorder  Co- Morbidities: Recently dx'd w/ undifferentiated connective tissue disorder    FOTO: 83; Goal: 83    Manual  8/16  8/17  8/22  8/29  9/5  9/12  10/3         MLD    30'    38'  42'  35'                                                                                                            Exercise Diary 8/16  8/17  8/22  9/1  9/7  9/12  10/3         THEREX             Pt ed 8' HEP instruct/handout  compression, elevation, exercise, lymphatic system [facility] ARON garment, pump  [facility] FOTO; Measurements                    Recumbent Bike                       Ankle pumps- elevated             Gastrocs Stretch                       HR/TR                       HS curls             Marches             Active HS stretch w/ ankle pumps             Heel Slides                                       NEURO RE-ED Gait Training                                                                       Modalities 8/22  9/1  9/7  10/3               Compression pump Applied  [employer]

## 2023-10-05 ENCOUNTER — OFFICE VISIT (OUTPATIENT)
Dept: PHYSICAL THERAPY | Facility: CLINIC | Age: 48
End: 2023-10-05
Payer: COMMERCIAL

## 2023-10-05 DIAGNOSIS — I89.0 LYMPHEDEMA: Primary | ICD-10-CM

## 2023-10-05 PROCEDURE — 97110 THERAPEUTIC EXERCISES: CPT

## 2023-10-05 PROCEDURE — 97140 MANUAL THERAPY 1/> REGIONS: CPT

## 2023-10-05 NOTE — PROGRESS NOTES
Daily Note     Today's date: 10/5/2023  Patient name: Raiza Contreras  : 1975  MRN: [id number]  Referring provider: Henry Lazo, *  Dx:   Encounter Diagnosis     ICD-10-CM    1. Lymphedema  I89.0                      Subjective: Pt reports no new complaints upon arrival.       Objective: See treatment diary below      Assessment: Tolerated treatment well. Patient would benefit from continued PT. Performed MLD to BLE to improve lymphatic flow and decrease swelling. Educated on compression, elevation, and exercise. Plan: D/C next session.       Daily Treatment Diary    EPOC: 10/24/23; RE: 10/24/23  Precautions: Recently dx'd w/ undifferentiated connective tissue disorder  Co- Morbidities: Recently dx'd w/ undifferentiated connective tissue disorder    FOTO: 83; Goal: 83    Manual  8/16  8/17  8/22  8/29  9/5  9/12  10/3 10/5        MLD    30'    38'  42'  35'    30'                                                                                                        Exercise Diary 8/16  8/17  8/22  9/1  9/7  9/12  10/3  10/5       THEREX             Pt ed 8' HEP instruct/handout  compression, elevation, exercise, lymphatic system [facility] ARON garment, pump  [facility] FOTO; Measurements [facility]                  Recumbent Bike                       Ankle pumps- elevated             Gastrocs Stretch                       HR/TR                       HS curls             Marches             Active HS stretch w/ ankle pumps             Heel Slides                                       NEURO RE-ED                                                                                                                        Gait Training                                                                       Modalities 8/22  9/1  9/7  10/3               Compression pump [employer]  [employer]

## 2023-10-10 ENCOUNTER — OFFICE VISIT (OUTPATIENT)
Dept: PHYSICAL THERAPY | Facility: CLINIC | Age: 48
End: 2023-10-10
Payer: COMMERCIAL

## 2023-10-10 DIAGNOSIS — I89.0 LYMPHEDEMA: Primary | ICD-10-CM

## 2023-10-10 PROCEDURE — 97110 THERAPEUTIC EXERCISES: CPT

## 2023-10-10 PROCEDURE — 97140 MANUAL THERAPY 1/> REGIONS: CPT

## 2023-10-10 NOTE — PROGRESS NOTES
PT Discharge     Collection of Subjective/Objective data performed by Santa Jama PTA. Assessment, POC, and Goal attainment performed by Tommie Liang DPT. Today's date: 10/10/2023  Patient name: Alicia Martin  : 1975  MRN: [id number]  Referring provider: Norma Boswell, *  Dx:   Encounter Diagnosis     ICD-10-CM    1. Lymphedema  I89.0           Start Time: 0800  Stop Time: 0849  Total time in clinic (min): 49 minutes    Assessment  Assessment details: Patient is a 46 y.o. female being treated as an outpatient at [facility] PT w/ dx of b/l LE lymphedema . Since eval, pt has improved function, demonstrated edema reduction, and improved FOTO measurements. She is compliant with wearing her compression garments. Pt also in the process of obtaining compression pumps to assist with swelling. Pt to be discharged to home management of condition. Impairments: activity intolerance, impaired physical strength, lacks appropriate home exercise program and pain with function  Other impairment: LE lymphedema  Understanding of Dx/Px/POC: good   Prognosis: good    Goals  ST. Pain decreased by 25% in 4 weeks. --met  2. Edema decreased by 2-4 cm in 4 weeks. --met   3. Pt will be independent w/ initial HEP in 1-2 visits. --met    LT. Decrease pain to 1-2/10 at worst by d/c.--met  2. Edema decreased by 5-10 cm in by d/c. --met  3.  Pt will be independent w/ self management of symptoms and comprehensive HEP by d/c.--met    Plan  Plan details: D/c to home management   Patient would benefit from: skilled physical therapy and PT eval  Planned modality interventions: cryotherapy and thermotherapy: hydrocollator packs  Other planned modality interventions: other modalities PRN  Planned therapy interventions: flexibility, functional ROM exercises, graded exercise, home exercise program, manual therapy, neuromuscular re-education, patient education, strengthening, therapeutic exercise, therapeutic activities, transfer training and massage  Other planned therapy interventions: other interventions PRN  Frequency: 2x week (1-2x/week)  Duration in weeks: 6  Plan of Care beginning date: 2023  Plan of Care expiration date: 10/24/2023  Treatment plan discussed with: patient        Subjective Evaluation    History of Present Illness  Mechanism of injury: CURRENT STATUS (10/10/23)  Pt reports having overall progress since starting lymphedema therapy. Pt reports she is compliant w/ wearing her compression garments. Pt is currently waiting for her compression pump to be approved. Pt will be able to manage lymphedema sxs w/ compression garment/pump, exercise, and elevation. PREVIOUS STATUS (23)  Pt reports steady progress towards her goals. Reports she feels that the swelling has improved some but it is hard to tell sometimes. Reports that stair negotiation is still challenging at times due to "heaviness." Pt notes she has been complaint in wearing the compression garments, which seems to be helping. Still waiting for compression pump to be approved. PREVIOUS STATUS (23)  Pt reports to IE w/ c/o b/l LE lymphedema which began   She believes it started during her first pregnancy (approx 19 years ago) and has been intermittent since then, but has most recently been progressively worsening. She reports that she developed a significant amount of edema while recently on vacation in the [place] on Monday, . She reports that she does not do well in the heat. She wears compression socks intermittently, but was unable while on vacation due to being in/out of the water so frequently. Pt reports that edema adds to "heaviness"/exhaustion w/ stair negotiation, excessive walking. Recent dopplers negative b/l    Pt has not been prescribed diuretics ever.    Patient Goals  Patient goals for therapy: decreased edema  Patient goal: Learn self-management  Pain  Current pain ratin  At worst pain ratin  Location: b/l LE  Quality: aching, uncomfortable. Alleviating factors: elevation. Exacerbated by: gravity dependent b/l LE for extended periods of time (sititng, walking, standing); heat/humidity. 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)        Objective     General Comments:       Ankle/Foot Comments   Observation: Edema in b/l LE w/ circumferential girth measurements as below (L/R):    20 cm proximal to knee: 76 cm/79.5 cm  10 cm proximal to knee:  67 cm/74 cm  Knee Joint: 51.5 cm/53 cm  30 cm proximal to malleoli:  53 cm/55 cm  20 cm proximal to malleoli:  47 cm/43 cm  10 cm proximal to malleoli: 39 cm /37.5 cm  Malleoli: 30 cm/30 cm  Midfoot:  26 cm/26 cm  MTP: 27.5 cm/26.5 cm    No erythema in b/l LE     LE Strength (L/R):   Hip Flexion: 4+ /4+  Knee Flexion: 5/5  Knee Extension: 5/5   Ankle DF: 5/5  Ankle PF:  5/5       Daily Treatment Diary    EPOC: 10/24/23; RE: 10/24/23  Precautions: Recently dx'd w/ undifferentiated connective tissue disorder  Co- Morbidities: Recently dx'd w/ undifferentiated connective tissue disorder    FOTO: 97; Goal: 83    Manual  8/16  8/17  8/22  8/29  9/5  9/12  10/3 10   10/10     MLD    30'    38'  42'  35'    30'  38'                                                                                                      Exercise Diary /7  9/12  10/3  10  10/10     THEREX             Pt ed 8' HEP instruct/handout  compression, elevation, exercise, lymphatic system [facility] ARON garment, pump  [facility] FOTO; Measurements [facility] ARON [facility] FOTO; Measurements                 Recumbent Bike                       Ankle pumps- elevated             Gastrocs Stretch                       HR/TR                       HS curls             Marches             Active HS stretch w/ ankle pumps             Heel Slides NEURO RE-ED                                                                                                                        Gait Training                                                                       Modalities 8/22  9/1  9/7  10/3               Compression pump Applied  [employer]

## 2023-10-12 ENCOUNTER — APPOINTMENT (OUTPATIENT)
Dept: PHYSICAL THERAPY | Facility: CLINIC | Age: 48
End: 2023-10-12
Payer: COMMERCIAL

## 2024-01-25 ENCOUNTER — APPOINTMENT (OUTPATIENT)
Dept: RADIOLOGY | Facility: CLINIC | Age: 49
End: 2024-01-25
Payer: COMMERCIAL

## 2024-01-25 ENCOUNTER — OFFICE VISIT (OUTPATIENT)
Dept: OBGYN CLINIC | Facility: CLINIC | Age: 49
End: 2024-01-25
Payer: COMMERCIAL

## 2024-01-25 VITALS
HEIGHT: 64 IN | SYSTOLIC BLOOD PRESSURE: 126 MMHG | DIASTOLIC BLOOD PRESSURE: 85 MMHG | BODY MASS INDEX: 50.02 KG/M2 | WEIGHT: 293 LBS | HEART RATE: 90 BPM

## 2024-01-25 DIAGNOSIS — M25.562 CHRONIC PAIN OF BOTH KNEES: ICD-10-CM

## 2024-01-25 DIAGNOSIS — M17.0 BILATERAL PRIMARY OSTEOARTHRITIS OF KNEE: Primary | ICD-10-CM

## 2024-01-25 DIAGNOSIS — M25.561 CHRONIC PAIN OF BOTH KNEES: ICD-10-CM

## 2024-01-25 DIAGNOSIS — G89.29 CHRONIC PAIN OF BOTH KNEES: ICD-10-CM

## 2024-01-25 PROCEDURE — 99204 OFFICE O/P NEW MOD 45 MIN: CPT | Performed by: ORTHOPAEDIC SURGERY

## 2024-01-25 PROCEDURE — 73564 X-RAY EXAM KNEE 4 OR MORE: CPT

## 2024-01-25 RX ORDER — CHOLECALCIFEROL (VITAMIN D3) 125 MCG
CAPSULE ORAL
COMMUNITY

## 2024-01-25 NOTE — PROGRESS NOTES
Patient Name:  Jaida Crockett  MRN:  [id number]    Assessment & Plan     1. Chronic pain of both knees  -     XR knee 4+ vw right injury; Future; Expected date: 01/25/2024  -     XR knee 4+ vw left injury; Future; Expected date: 01/25/2024  -     Injection Procedure Prior Authorization; Future    2. Bilateral primary osteoarthritis of knee  -     Injection Procedure Prior Authorization; Future        49 y.o. female with Bilateral knee osteoarthritis.   X-rays reviewed in office today with patient.   Treatment options were discussed with the patient including corticosteroid injections, viscosupplementation, oral medications, topical medications, physical therapy.  Discussed network BMI requirement of 40 or below for consideration for TKA  Preauthorization was requested for Durolane for her bilateral knees  She will continue her home exercise plan  She will follow up once viscosupplementation is approved    Chief Complaint     Bilateral knee pain    History of the Present Illness     Jaida Crockett is a 49 y.o. female with Bilateral knee pain. She twisted her left knee while doing yard work in summer 2022. Patient was previously treated by Dr Durán at [facility] with corticosteroid injections and viscosupplementation. She last had a gel injection in June 2022. She completed physical therapy for her knees in May-June 2023 and noted great relief from this. She admits to being under the care of a rheumatologist. She complains of pinching in her left knee. Pain is rated 5/10.    Review of Systems     Review of Systems   Constitutional:  Negative for chills and fever.   HENT:  Negative for ear pain and sore throat.    Eyes:  Negative for pain and visual disturbance.   Respiratory:  Negative for cough and shortness of breath.    Cardiovascular:  Negative for chest pain and palpitations.   Gastrointestinal:  Negative for abdominal pain and vomiting.   Genitourinary:  Negative for dysuria and hematuria.  "  Musculoskeletal:  Negative for arthralgias and back pain.   Skin:  Negative for color change and rash.   Neurological:  Negative for seizures and syncope.   All other systems reviewed and are negative.      Physical Exam     /85   Pulse 90   Ht 5' 4\" (1.626 m)   Wt (!) 141 kg (310 lb 3.2 oz)   BMI 53.25 kg/m²     Right Knee  Range of motion from  0 to 100.    There is no crepitus with range of motion.   There is no effusion.    There is mild tenderness over the medial joint line.    There is 5/5 quadriceps strength and equal tone.    The patient is able to perform a straight leg raise.      Varus stress testing reveals no instability at 0 and 30 degrees   Valgus stress testing reveals no instability at 0 and 30 degrees  The patient is neurovascular intact distally.    Left Knee  Range of motion from 0 to 100.    There is no crepitus with range of motion.   There is no effusion.    There is tenderness over the medial joint line.    There is 5/5 quadriceps strength and equal tone.    The patient is able to perform a straight leg raise.      Varus stress testing reveals no instability at 0 and 30 degrees   Valgus stress testing reveals no instability at 0 and 30 degrees  The patient is neurovascular intact distally.    Eyes:  Anicteric sclerae.  Neck:  Supple.  Lungs:  Normal respiratory effort.  Cardiovascular:  Capillary refill is less than 2 seconds.  Skin:  Intact without erythema.  Neurologic:  Sensation grossly intact to light touch.  Psychiatric:  Mood and affect are appropriate.    Data Review     I have personally reviewed pertinent films in PACS, and my interpretation follows:    X-rays taken 01/25/2024 of Right knee independently reviewed and demonstrate severe medial joint space narrowing. No acute fracture.    X-rays taken 01/25/2024 of Left knee independently reviewed and demonstrate severe medial joint space narrowing. No acute fracture    Past Medical History:   Diagnosis Date   • Animal " bite     Pt reports being attacked by black bear- scratched, etc   • Connective tissue disorder (HCC)    • Sleep apnea        Past Surgical History:   Procedure Laterality Date   • BLADDER IRRIGATION     • BLADDER SURGERY  2012   • KNEE SURGERY Right     1997       No Known Allergies    Current Outpatient Medications on File Prior to Visit   Medication Sig Dispense Refill   • Calcium Carb-Cholecalciferol (CALCIUM CARBONATE+VITAMIN D PO) Take by mouth     • Cholecalciferol (Vitamin D) 125 MCG (5000 UT) CAPS      • Hydroxychloroquine Sulfate (PLAQUENIL PO) Take by mouth     • Levonorgestrel (MIRENA) 20 MCG/DAY IUD 1 ea     • Magnesium 125 MG CAPS      • MELOXICAM PO Take by mouth     • MODAFINIL PO Take by mouth     • [DISCONTINUED] MAGNESIUM PO Take by mouth     • [DISCONTINUED] naproxen (NAPROSYN) 250 mg tablet Take 250 mg by mouth 2 (two) times a day with meals (Patient not taking: Reported on 5/31/2023)       No current facility-administered medications on file prior to visit.       Social History     Tobacco Use   • Smoking status: Never   • Smokeless tobacco: Never   Vaping Use   • Vaping status: Never Used   Substance Use Topics   • Alcohol use: Yes     Comment: social   • Drug use: Never       Family History   Problem Relation Age of Onset   • Diabetes Mother    • Heart disease Mother    • Diabetes Father    • Dementia Father              Procedures Performed     Procedures  None.      Margo Amato  Scribe Attestation    I,:  Margo Amato am acting as a scribe while in the presence of the attending physician.:       I,:  Kalen Walker,  personally performed the services described in this documentation    as scribed in my presence.:

## 2024-02-19 ENCOUNTER — PROCEDURE VISIT (OUTPATIENT)
Dept: OBGYN CLINIC | Facility: CLINIC | Age: 49
End: 2024-02-19
Payer: COMMERCIAL

## 2024-02-19 VITALS
BODY MASS INDEX: 50.02 KG/M2 | DIASTOLIC BLOOD PRESSURE: 84 MMHG | HEART RATE: 90 BPM | SYSTOLIC BLOOD PRESSURE: 131 MMHG | HEIGHT: 64 IN | WEIGHT: 293 LBS

## 2024-02-19 DIAGNOSIS — M17.0 BILATERAL PRIMARY OSTEOARTHRITIS OF KNEE: Primary | ICD-10-CM

## 2024-02-19 PROCEDURE — 20610 DRAIN/INJ JOINT/BURSA W/O US: CPT | Performed by: PHYSICIAN ASSISTANT

## 2024-02-19 RX ORDER — MODAFINIL 200 MG/1
TABLET ORAL
COMMUNITY
Start: 2024-02-12

## 2024-02-19 RX ORDER — MELOXICAM 15 MG/1
15 TABLET ORAL DAILY
COMMUNITY
Start: 2024-02-04

## 2024-02-19 NOTE — PROGRESS NOTES
Patient has reported improvements from previous visco injections. Pain is rated at 6/10.  After discussing the options for treatment, the patient elected to proceed forward with Bilateral knee Monovisc injections to reduce pain. Risks of injection, including but not limited to, post-injection pain, swelling, pseudo septic reaction, skin rash, itching, and infection were discussed in detail.  The patient understood, had no further questions and elected to proceed forward.  After sterile preparation, the Monovisc injections were injected into the Bilateral knees, respectively.  The patient tolerated the procedure well no complications were noted.  The patient was instructed ice and elevate the extremity, limit strenuous activity for the next 2-3 days, and to contact us if there were any questions or concerns prior to their follow-up appointment.  I will see the patient back in 3 months for reevaluation of bilateral knees.        Large joint arthrocentesis: R knee  Universal Protocol:  Risks and benefits: risks, benefits and alternatives were discussed  Consent given by: patient  Patient identity confirmed: verbally with patient  Supporting Documentation  Indications: pain   Procedure Details  Location: knee - R knee  Needle size: 22 G  Medications administered: 88 mg hyaluronan 88 MG/4ML    Patient tolerance: patient tolerated the procedure well with no immediate complications  Dressing:  Sterile dressing applied      Large joint arthrocentesis: L knee  Universal Protocol:  Risks and benefits: risks, benefits and alternatives were discussed  Consent given by: patient  Patient identity confirmed: verbally with patient  Supporting Documentation  Indications: pain   Procedure Details  Location: knee - L knee  Needle size: 22 G  Approach: lateral  Medications administered: 88 mg hyaluronan 88 MG/4ML    Patient tolerance: patient tolerated the procedure well with no immediate complications  Dressing:  Sterile dressing  applied

## 2024-05-20 ENCOUNTER — OFFICE VISIT (OUTPATIENT)
Dept: OBGYN CLINIC | Facility: CLINIC | Age: 49
End: 2024-05-20
Payer: COMMERCIAL

## 2024-05-20 VITALS
HEART RATE: 97 BPM | WEIGHT: 293 LBS | SYSTOLIC BLOOD PRESSURE: 131 MMHG | BODY MASS INDEX: 50.02 KG/M2 | HEIGHT: 64 IN | DIASTOLIC BLOOD PRESSURE: 84 MMHG

## 2024-05-20 DIAGNOSIS — G89.29 CHRONIC PAIN OF LEFT KNEE: ICD-10-CM

## 2024-05-20 DIAGNOSIS — M17.0 BILATERAL PRIMARY OSTEOARTHRITIS OF KNEE: Primary | ICD-10-CM

## 2024-05-20 DIAGNOSIS — M25.562 CHRONIC PAIN OF LEFT KNEE: ICD-10-CM

## 2024-05-20 DIAGNOSIS — G89.29 CHRONIC PAIN OF RIGHT KNEE: ICD-10-CM

## 2024-05-20 DIAGNOSIS — M25.561 CHRONIC PAIN OF RIGHT KNEE: ICD-10-CM

## 2024-05-20 PROCEDURE — 20610 DRAIN/INJ JOINT/BURSA W/O US: CPT | Performed by: ORTHOPAEDIC SURGERY

## 2024-05-20 PROCEDURE — 99214 OFFICE O/P EST MOD 30 MIN: CPT | Performed by: ORTHOPAEDIC SURGERY

## 2024-05-20 RX ORDER — BUPIVACAINE HYDROCHLORIDE 2.5 MG/ML
2 INJECTION, SOLUTION INFILTRATION; PERINEURAL
Status: COMPLETED | OUTPATIENT
Start: 2024-05-20 | End: 2024-05-20

## 2024-05-20 RX ORDER — LEFLUNOMIDE 20 MG/1
20 TABLET ORAL DAILY
COMMUNITY

## 2024-05-20 RX ORDER — METHYLPREDNISOLONE ACETATE 40 MG/ML
2 INJECTION, SUSPENSION INTRA-ARTICULAR; INTRALESIONAL; INTRAMUSCULAR; SOFT TISSUE
Status: COMPLETED | OUTPATIENT
Start: 2024-05-20 | End: 2024-05-20

## 2024-05-20 RX ORDER — LIDOCAINE HYDROCHLORIDE 10 MG/ML
2 INJECTION, SOLUTION INFILTRATION; PERINEURAL
Status: COMPLETED | OUTPATIENT
Start: 2024-05-20 | End: 2024-05-20

## 2024-05-20 RX ADMIN — BUPIVACAINE HYDROCHLORIDE 2 ML: 2.5 INJECTION, SOLUTION INFILTRATION; PERINEURAL at 10:45

## 2024-05-20 RX ADMIN — METHYLPREDNISOLONE ACETATE 2 ML: 40 INJECTION, SUSPENSION INTRA-ARTICULAR; INTRALESIONAL; INTRAMUSCULAR; SOFT TISSUE at 10:45

## 2024-05-20 RX ADMIN — LIDOCAINE HYDROCHLORIDE 2 ML: 10 INJECTION, SOLUTION INFILTRATION; PERINEURAL at 10:45

## 2024-05-20 NOTE — PROGRESS NOTES
Patient Name:  Jaida Crockett  MRN:  [id number]    Assessment & Plan     1. Bilateral primary osteoarthritis of knee  -     Large joint arthrocentesis: R knee  -     Large joint arthrocentesis: L knee  2. Chronic pain of right knee  -     Large joint arthrocentesis: R knee  3. Chronic pain of left knee  -     Large joint arthrocentesis: L knee    Bilateral knee osteoarthritis.  The patient was offered a corticosteroid injection for their bilateral knees. They tolerated the procedure well.    The patient was educated they may have some irritation in the next few days and should rest, ice, elevate and perform gentle range of motion exercises. They were advised the medicine should begin to work in a few days time. The patient was informed corticosteroid injections can be repeated at the earliest every 3 months.   Continue home exercise plan  OTC analgesics as needed  Follow up on an as needed basis       History of the Present Illness   Jaida Crockett is a 49 y.o. female with Bilateral knee osteoarthritis s/p Monovisc injections on 2/19/2024. Today the patient reports significant improvement with the viscosupplementation. She is still complaining of muscle soreness around her knees. Her pain today is rated 5/10. She has not had a corticosteroid injection in a long time. She would like to try CSI today. 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         Review of Systems     Review of Systems   Constitutional:  Negative for chills and fever.   HENT:  Negative for ear pain and sore throat.    Eyes:  Negative for pain and visual disturbance.   Respiratory:  Negative for cough and shortness of breath.    Cardiovascular:  Negative for chest pain and palpitations.   Gastrointestinal:  Negative for abdominal pain and vomiting.   Genitourinary:  Negative for dysuria and hematuria.   Musculoskeletal:  Negative for arthralgias and back pain.   Skin:  Negative for color change and rash.   Neurological:   "Negative for seizures and syncope.   All other systems reviewed and are negative.      Physical Exam     /84   Pulse 97   Ht 5' 4\" (1.626 m)   Wt (!) 144 kg (317 lb 12.8 oz)   BMI 54.55 kg/m²     Right Knee  Range of motion from 0 to 100.    There is no effusion.    There is mild tenderness over the medial joint line.    The patient is able to perform a straight leg raise.    Varus stress testing reveals no instability at 0 and 30 degrees   Valgus stress testing reveals no instability at 0 and 30 degrees  The patient is neurovascular intact distally.    Left  Knee  Range of motion from 0 to 100.    There is no effusion.    There is mild tenderness over the medial joint line.    The patient is able to perform a straight leg raise.    Varus stress testing reveals no instability at 0 and 30 degrees   Valgus stress testing reveals no instability at 0 and 30 degrees  The patient is neurovascular intact distally.  Data Review     I have personally reviewed pertinent films in PACS, and my interpretation follows.    X-rays taken 01/25/2024 of Right knee independently reviewed and demonstrate severe medial joint space narrowing. No acute fracture.     X-rays taken 01/25/2024 of Left knee independently reviewed and demonstrate severe medial joint space narrowing. No acute fracture  Social History     Tobacco Use    Smoking status: Never    Smokeless tobacco: Never   Vaping Use    Vaping status: Never Used   Substance Use Topics    Alcohol use: Not Currently     Comment: social    Drug use: Never           Large joint arthrocentesis: R knee  Universal Protocol:  Consent: Verbal consent obtained.  Risks and benefits: risks, benefits and alternatives were discussed  Consent given by: patient  Time out: Immediately prior to procedure a \"time out\" was called to verify the correct patient, procedure, equipment, support staff and site/side marked as required.  Patient understanding: patient states understanding of the " "procedure being performed  Site marked: the operative site was marked  Patient identity confirmed: verbally with patient  Supporting Documentation  Indications: pain   Procedure Details  Location: knee - R knee  Preparation: Patient was prepped and draped in the usual sterile fashion  Needle size: 22 G  Ultrasound guidance: no  Approach: anterolateral  Medications administered: 2 mL bupivacaine 0.25 %; 2 mL methylPREDNISolone acetate 40 mg/mL; 2 mL lidocaine 1 %    Patient tolerance: patient tolerated the procedure well with no immediate complications  Dressing:  Sterile dressing applied      Large joint arthrocentesis: L knee  Universal Protocol:  Consent: Verbal consent obtained.  Risks and benefits: risks, benefits and alternatives were discussed  Consent given by: patient  Time out: Immediately prior to procedure a \"time out\" was called to verify the correct patient, procedure, equipment, support staff and site/side marked as required.  Patient understanding: patient states understanding of the procedure being performed  Site marked: the operative site was marked  Patient identity confirmed: verbally with patient  Supporting Documentation  Indications: pain   Procedure Details  Location: knee - L knee  Preparation: Patient was prepped and draped in the usual sterile fashion  Needle size: 22 G  Ultrasound guidance: no  Approach: anterolateral  Medications administered: 2 mL bupivacaine 0.25 %; 2 mL methylPREDNISolone acetate 40 mg/mL; 2 mL lidocaine 1 %    Patient tolerance: patient tolerated the procedure well with no immediate complications  Dressing:  Sterile dressing applied            Kalen Walker DO   Scribe Attestation      I,:  Margo Amato am acting as a scribe while in the presence of the attending physician.:       I,:  Kalen Walkre DO personally performed the services described in this documentation    as scribed in my presence.:             "

## 2024-08-23 ENCOUNTER — OFFICE VISIT (OUTPATIENT)
Dept: OBGYN CLINIC | Facility: CLINIC | Age: 49
End: 2024-08-23
Payer: COMMERCIAL

## 2024-08-23 VITALS
WEIGHT: 293 LBS | HEIGHT: 64 IN | HEART RATE: 101 BPM | BODY MASS INDEX: 50.02 KG/M2 | DIASTOLIC BLOOD PRESSURE: 77 MMHG | SYSTOLIC BLOOD PRESSURE: 117 MMHG

## 2024-08-23 DIAGNOSIS — M25.562 CHRONIC PAIN OF LEFT KNEE: ICD-10-CM

## 2024-08-23 DIAGNOSIS — M17.0 BILATERAL PRIMARY OSTEOARTHRITIS OF KNEE: Primary | ICD-10-CM

## 2024-08-23 DIAGNOSIS — M25.561 CHRONIC PAIN OF RIGHT KNEE: ICD-10-CM

## 2024-08-23 DIAGNOSIS — G89.29 CHRONIC PAIN OF RIGHT KNEE: ICD-10-CM

## 2024-08-23 DIAGNOSIS — G89.29 CHRONIC PAIN OF LEFT KNEE: ICD-10-CM

## 2024-08-23 PROCEDURE — 20610 DRAIN/INJ JOINT/BURSA W/O US: CPT | Performed by: ORTHOPAEDIC SURGERY

## 2024-08-23 PROCEDURE — 99214 OFFICE O/P EST MOD 30 MIN: CPT | Performed by: ORTHOPAEDIC SURGERY

## 2024-08-23 RX ORDER — COLCHICINE 0.6 MG/1
0.6 TABLET ORAL DAILY PRN
COMMUNITY
Start: 2024-08-21 | End: 2025-08-21

## 2024-08-23 RX ORDER — LIDOCAINE HYDROCHLORIDE 10 MG/ML
2 INJECTION, SOLUTION INFILTRATION; PERINEURAL
Status: COMPLETED | OUTPATIENT
Start: 2024-08-23 | End: 2024-08-23

## 2024-08-23 RX ORDER — METHYLPREDNISOLONE ACETATE 40 MG/ML
2 INJECTION, SUSPENSION INTRA-ARTICULAR; INTRALESIONAL; INTRAMUSCULAR; SOFT TISSUE
Status: COMPLETED | OUTPATIENT
Start: 2024-08-23 | End: 2024-08-23

## 2024-08-23 RX ORDER — BUPIVACAINE HYDROCHLORIDE 2.5 MG/ML
2 INJECTION, SOLUTION INFILTRATION; PERINEURAL
Status: COMPLETED | OUTPATIENT
Start: 2024-08-23 | End: 2024-08-23

## 2024-08-23 RX ADMIN — LIDOCAINE HYDROCHLORIDE 2 ML: 10 INJECTION, SOLUTION INFILTRATION; PERINEURAL at 10:45

## 2024-08-23 RX ADMIN — BUPIVACAINE HYDROCHLORIDE 2 ML: 2.5 INJECTION, SOLUTION INFILTRATION; PERINEURAL at 10:45

## 2024-08-23 RX ADMIN — METHYLPREDNISOLONE ACETATE 2 ML: 40 INJECTION, SUSPENSION INTRA-ARTICULAR; INTRALESIONAL; INTRAMUSCULAR; SOFT TISSUE at 10:45

## 2024-08-23 NOTE — PROGRESS NOTES
Patient Name:  Jaida Crockett  MRN:  [id number]    Assessment & Plan     1. Bilateral primary osteoarthritis of knee  -     Large joint arthrocentesis: R knee  -     Large joint arthrocentesis: L knee  2. Chronic pain of right knee  -     Large joint arthrocentesis: R knee  -     Large joint arthrocentesis: L knee  3. Chronic pain of left knee  -     Large joint arthrocentesis: R knee  -     Large joint arthrocentesis: L knee      Bilateral knee Osteoarthritis with recently worsening pain.   Continued nonoperative treatments were discussed and the patient was offered repeat corticosteroid injections.  Risks and benefits were discussed in detail patient tolerated procedures well.  The patient was educated they may have some irritation in the next few days and should rest, ice, elevate and perform gentle range of motion exercises. They were advised the medicine should begin to work in a few days time. The patient was informed corticosteroid injections can be repeated at the earliest every 3 months.   Continue home exercise plan  OTC analgesics as needed  Follow up on an as needed basis     History of the Present Illness   Jaida Crockett is a 49 y.o. female with Bilateral knee pain. She has known BL knee osteoarthritis. She has been treating conservatively with a home therapy program, Monovisc injections on 2/19/2024 that did provide her relief and corticosteroid injections given at last office visit on 5/20/24.  Patient reports today feeling the cortisone injections gave her significant pain relief up until 3 weeks ago.  She does feel catching, clicking, popping in both knees, left worse than right.  Patient reports today requesting to repeat cortisone injections for both knees.       Review of Systems     Review of Systems   Constitutional:  Negative for chills and fever.   HENT:  Negative for ear pain and sore throat.    Eyes:  Negative for pain and visual disturbance.   Respiratory:  Negative for cough and  "shortness of breath.    Cardiovascular:  Negative for chest pain and palpitations.   Gastrointestinal:  Negative for abdominal pain and vomiting.   Genitourinary:  Negative for dysuria and hematuria.   Musculoskeletal:  Positive for arthralgias. Negative for back pain.   Skin:  Negative for color change and rash.   Neurological:  Negative for seizures and syncope.   All other systems reviewed and are negative.      Physical Exam     /77   Pulse 101   Ht 5' 4\" (1.626 m)   Wt (!) 144 kg (318 lb)   BMI 54.58 kg/m²     Right Knee  Range of motion from 0 to 95.  There is trace effusion.    There is tenderness over the medial joint line  The patient is able to perform a straight leg raise.    The patient is neurovascular intact distally.  Left Knee  Range of motion from 0 to 100.  There is no effusion.    There is tenderness over the medial joint line  The patient is able to perform a straight leg raise.    The patient is neurovascular intact distally.        Data Review     I have personally reviewed pertinent films in PACS, and my interpretation follows.    X-rays taken 01/25/2024 of Right knee independently reviewed and demonstrate severe medial joint space narrowing. No acute fracture.     X-rays taken 01/25/2024 of Left knee independently reviewed and demonstrate severe medial joint space narrowing. No acute fracture       Social History     Tobacco Use    Smoking status: Never    Smokeless tobacco: Never   Vaping Use    Vaping status: Never Used   Substance Use Topics    Alcohol use: Not Currently     Comment: social    Drug use: Never           Large joint arthrocentesis: R knee  Universal Protocol:  Consent: Verbal consent obtained.  Risks and benefits: risks, benefits and alternatives were discussed  Consent given by: patient  Time out: Immediately prior to procedure a \"time out\" was called to verify the correct patient, procedure, equipment, support staff and site/side marked as required.  Patient " "understanding: patient states understanding of the procedure being performed  Site marked: the operative site was marked  Supporting Documentation  Indications: pain   Procedure Details  Location: knee - R knee  Preparation: Patient was prepped and draped in the usual sterile fashion  Needle size: 22 G  Ultrasound guidance: no  Approach: anterolateral  Medications administered: 2 mL bupivacaine 0.25 %; 2 mL lidocaine 1 %; 2 mL methylPREDNISolone acetate 40 mg/mL    Patient tolerance: patient tolerated the procedure well with no immediate complications  Dressing:  Sterile dressing applied      Large joint arthrocentesis: L knee  Universal Protocol:  Consent: Verbal consent obtained.  Risks and benefits: risks, benefits and alternatives were discussed  Consent given by: patient  Time out: Immediately prior to procedure a \"time out\" was called to verify the correct patient, procedure, equipment, support staff and site/side marked as required.  Patient understanding: patient states understanding of the procedure being performed  Site marked: the operative site was marked  Supporting Documentation  Indications: pain   Procedure Details  Location: knee - L knee  Preparation: Patient was prepped and draped in the usual sterile fashion  Needle size: 22 G  Ultrasound guidance: no  Approach: anterolateral  Medications administered: 2 mL bupivacaine 0.25 %; 2 mL lidocaine 1 %; 2 mL methylPREDNISolone acetate 40 mg/mL    Patient tolerance: patient tolerated the procedure well with no immediate complications  Dressing:  Sterile dressing applied            Scribe Attestation      I,:  Veronika Sandoval am acting as a scribe while in the presence of the attending physician.:       I,:  Kalen Walker DO personally performed the services described in this documentation    as scribed in my presence.:              Kalen Walker DO   "

## 2024-11-06 ENCOUNTER — TELEPHONE (OUTPATIENT)
Age: 49
End: 2024-11-06

## 2024-11-06 NOTE — TELEPHONE ENCOUNTER
Caller: Jaida    Doctor: Denise    Reason for call: Patient is following up on message she sent in [email or web address] on 10/31/2024. Patient is starting to have increased pain in bilateral knees and would like to have VISCO injections again. Last injections were 02/19/2024. Patient would like to know if she needs to come in for a follow up appointment regarding the increased pain or if she can just have the visco pre authorized by insurance to move forward. Please advise.     Call back#: [phone]

## 2024-11-08 DIAGNOSIS — M17.0 BILATERAL PRIMARY OSTEOARTHRITIS OF KNEE: Primary | ICD-10-CM

## 2024-11-15 ENCOUNTER — PROCEDURE VISIT (OUTPATIENT)
Dept: OBGYN CLINIC | Facility: CLINIC | Age: 49
End: 2024-11-15

## 2024-11-15 VITALS
SYSTOLIC BLOOD PRESSURE: 138 MMHG | WEIGHT: 293 LBS | HEIGHT: 64 IN | BODY MASS INDEX: 50.02 KG/M2 | DIASTOLIC BLOOD PRESSURE: 96 MMHG | HEART RATE: 106 BPM

## 2024-11-15 DIAGNOSIS — M17.0 BILATERAL PRIMARY OSTEOARTHRITIS OF KNEE: Primary | ICD-10-CM

## 2024-11-15 NOTE — PROGRESS NOTES
Patient has reported improvements from previous visco injections. Pain is rated at 4/10.  After discussing the options for treatment, the patient elected to proceed forward with Bilateral knee Monovisc injection to reduce pain. Risks of injection, including but not limited to, post-injection pain, swelling, pseudo septic reaction, skin rash, itching, and infection were discussed in detail.  The patient understood, had no further questions and elected to proceed forward.  After sterile preparation, the Monovisc injections were injected into the Bilateral knees, respectively.  The patient tolerated the procedure well no complications were noted.  The patient was instructed ice and elevate the extremity, limit strenuous activity for the next 2-3 days, and to contact us if there were any questions or concerns prior to their follow-up appointment.  I will see the patient back as needed. PT script placed today for aqua therapy.         Large joint arthrocentesis: R knee  Universal Protocol:  Risks and benefits: risks, benefits and alternatives were discussed  Consent given by: patient  Patient identity confirmed: verbally with patient  Supporting Documentation  Indications: pain   Procedure Details  Location: knee - R knee  Needle size: 22 G  Approach: lateral  Medications administered: 88 mg hyaluronan 88 MG/4ML    Patient tolerance: patient tolerated the procedure well with no immediate complications  Dressing:  Sterile dressing applied      Large joint arthrocentesis: L knee  Universal Protocol:  Risks and benefits: risks, benefits and alternatives were discussed  Consent given by: patient  Supporting Documentation  Indications: pain   Procedure Details  Location: knee - L knee  Needle size: 22 G  Approach: lateral  Medications administered: 88 mg hyaluronan 88 MG/4ML    Patient tolerance: patient tolerated the procedure well with no immediate complications  Dressing:  Sterile dressing applied

## 2024-11-21 NOTE — PROGRESS NOTES
PT Evaluation     Today's date: 2024  Patient name: Jaida Crockett  : 1975  MRN: [id number]  Referring provider: Karolina Rachel PA-C  Dx:   Encounter Diagnosis     ICD-10-CM    1. Bilateral primary osteoarthritis of knee  M17.0           Start Time: 1130  Stop Time: 1230  Total time in clinic (min): 60 minutes    Assessment  Impairments: abnormal gait, abnormal or restricted ROM, activity intolerance, impaired physical strength, lacks appropriate home exercise program, pain with function, poor posture  and poor body mechanics    Assessment details: Jaida Crockett is a 49 y.o. female who presents with  bilateral knee and left LE/UE pain, decreased  core and LE strength, decreased ROM, and ambulatory dysfunction. Due to these impairments, Patient has difficulty performing a/iadls and recreational activities. Patient's clinical presentation is consistent with their referring diagnosis . Patient would benefit from a trial of skilled aquatic physical therapy to address their aforementioned impairments, improve their level of function and to improve their overall quality of life.  Understanding of Dx/Px/POC: good     Prognosis: good    Goals  ST-3 WEEKS  1.  Decrease pain by 2 points on VAS at its worst.  2.  Increase ROM by left knee to equal to right   3.  Increase LE strength by 1/2 MMT grade in all deficient planes.    LT-6 WEEKS  1. Patient to be independent with a/iadls.  2. Increase functional activities for leisure and home activities to previous LOF.  3. Independent with HEP and/or fitness program.  4. Tolerate a 45 minute aquatic exercise program without exacerbation of symptoms    Plan  Patient would benefit from: skilled physical therapy  Planned modality interventions: cryotherapy and low level laser therapy    Planned therapy interventions: activity modification, behavior modification, aquatic therapy, functional ROM exercises, home exercise program, joint mobilization, manual  therapy, neuromuscular re-education, patient education, postural training, therapeutic activities and therapeutic exercise    Frequency: 2x week  Duration in weeks: 8  Plan of Care beginning date: 2024  Plan of Care expiration date: 2025  Treatment plan discussed with: patient        Subjective Evaluation    History of Present Illness  Mechanism of injury: Three years ago twisted left knee while gardening.  Straining right knee while favoring left LE. - Developed bone marrow edema.  Had injections at the time without much relief.  One year later had PT with relief and increased function.  Started seeing new ortho with concern of best care for knees until and if a surgery is an option.  Diagnosed on autoimmune spectrum over the past 2-3 years.  - some connective tissue disorder. Hand injury and some pain. Does lymphedema therapy with compression sleeves  at home  Recent cortisone and gel injections with relief.   Fatigue returns and achiness in entire left UE/LE  Last in PT 1.5 years ago. Feels weaker on the left side    Patient Goals  Patient goals for therapy: improved balance and increased strength  Patient goal: improved stretngth left leg; increase core strength  Pain  Current pain ratin  At best pain ratin  At worst pain ratin  Location: left knee greater medially  Quality: dull ache and sharp  Relieving factors: rest and ice (injections)  Aggravating factors: walking, standing and stair climbing    Social Support    Employment status: working (self employed)  Exercise history: stretching    Treatments  Current treatment: injection treatment      Objective     Tenderness   Left Knee   Tenderness in the medial joint line.     Neurological Testing     Sensation     Knee   Left Knee   Paresthesia: Light touch    Comments   Left light touch: posterior thigh, lateral thigh.     Active Range of Motion   Left Knee   Flexion: 109 degrees   Extension: -2 degrees     Right Knee   Flexion: 116  degrees   Extension: 0 degrees     Strength/Myotome Testing     Left Hip   Planes of Motion   Flexion: 4-  Abduction: 4-  Adduction: 4-    Right Hip   Planes of Motion   Flexion: 4  Abduction: 4  Adduction: 4    Left Knee   Flexion: 4-  Extension: 4-  Quadriceps contraction: good    Right Knee   Flexion: 4  Extension: 4  Quadriceps contraction: good    Additional Strength Details  Independent straight leg raise -more challenging on the left lower extremity.    Ambulation   Weight-Bearing Status   Assistive device used: none    Ambulation: Level Surfaces   Ambulation without assistive device: independent    Ambulation: Stairs   Ascend stairs: independent  Pattern: reciprocal  Railings: one rail  Descend stairs: independent  Pattern: non-reciprocal  Railings: one rail    Functional Assessment        Single Leg Stance   Left: 4 seconds  Right: 5 seconds    Comments  Timed sit to stand 5x = 16 seconds               POC expires Unit limit Auth Expiration date PT/OT + Visit Limit?   2/18/25 4 NA BOMN                           Visit/Unit Tracking  AUTH Status:  Date 11/22              No AUTH/ $20 copay Used 1               Remaining                 FOTO done                    Precautions: rheumatic disease, connective tissue disorder    Daily Treatment Diary     Date 11/22          Patient education 6'          Water Walk (FW, BW, side) x10’  5'5'          LE work at wall               Hip FF/ext swing  2            Toe/heel  1            Hip ABD/ADD  2            March 1            Knee flexion & extension 1            Squats           UE noodle x3             Push/pull              Rotate              Row forward & back              OH side bend            UE/Core (AROM, paddles, MB)              ABD/ADD              Horizontal Pec Fly              Alt. arm swing (shld flex/ext)              Push pull              Single paddle rotation           SLS (EO, EC, ball toss)            Aqua Step (FW, lat, eccentric)             Core work:              MF press (red, blue, blk)             Kickboard press (blue, red)              Row/ext w/ tubing (red, blue, blk)           Seated on bench             ankle DF/PF              March              ABD/ADD              Knee flexion/extension            DW TX - hang in the deep water  3'            DW ABD/ADD              DW Bicycle  4'            DW Scissor hip flexion/extension              DW DKTC            Stretches              HS at step              Wall stretches              Calf stretch at wall              Other:            Hot Tub - 10 minutes only               4'

## 2024-11-22 ENCOUNTER — EVALUATION (OUTPATIENT)
Dept: PHYSICAL THERAPY | Age: 49
End: 2024-11-22
Payer: COMMERCIAL

## 2024-11-22 DIAGNOSIS — M17.0 BILATERAL PRIMARY OSTEOARTHRITIS OF KNEE: Primary | ICD-10-CM

## 2024-11-22 PROCEDURE — 97162 PT EVAL MOD COMPLEX 30 MIN: CPT | Performed by: PHYSICAL THERAPIST

## 2024-11-22 PROCEDURE — 97113 AQUATIC THERAPY/EXERCISES: CPT | Performed by: PHYSICAL THERAPIST

## 2024-11-25 ENCOUNTER — OFFICE VISIT (OUTPATIENT)
Dept: PHYSICAL THERAPY | Age: 49
End: 2024-11-25
Payer: COMMERCIAL

## 2024-11-25 DIAGNOSIS — M17.0 BILATERAL PRIMARY OSTEOARTHRITIS OF KNEE: Primary | ICD-10-CM

## 2024-11-25 PROCEDURE — 97113 AQUATIC THERAPY/EXERCISES: CPT

## 2024-11-25 PROCEDURE — 97150 GROUP THERAPEUTIC PROCEDURES: CPT

## 2024-11-25 NOTE — PROGRESS NOTES
Daily Note     Today's date: 2024  Patient name: Jaida Crockett  : 1975  MRN: [id number]  Referring provider: Karolina Rachel PA-C  Dx:   Encounter Diagnosis     ICD-10-CM    1. Bilateral primary osteoarthritis of knee  M17.0           Start Time: 1000  Stop Time: 1100  Total time in clinic (min): 60 minutes    Subjective:  Patient reports 3/10 L knee and 1/10 R knee pain today      Objective: See treatment diary below      Assessment: Tolerated treatment well, no c/o pain with TE. Patient demonstrated fatigue post treatment and would benefit from continued PT      Plan: Continue per plan of care.     Patient seen 1:1 for 30 minutes and rest of time group setting.       POC expires Unit limit Auth Expiration date PT/OT + Visit Limit?   25 4 NA BOMN                           Visit/Unit Tracking  AUTH Status:  Date              No AUTH/ $20 copay Used 1 2              Remaining                 FOTO done                    Precautions: rheumatic disease, connective tissue disorder    Daily Treatment Diary     Date          Patient education 6'          Water Walk (FW, BW, side) x10’  5'5' 10'         LE work at wall               Hip FF/ext swing  2 2           Toe/heel  1 1           Hip ABD/ADD  2 2            1           Knee flexion & extension 1 1           Squats  1         UE noodle x3             Push/pull              Rotate              Row forward & back              OH side bend            UE/Core (AROM, paddles, MB)              ABD/ADD              Horizontal Pec Fly              Alt. arm swing (shld flex/ext)              Push pull              Single paddle rotation           SLS (EO, EC, ball toss)            Aqua Step (FW, lat, eccentric)            Core work:              MF press (red, blue, blk)             Kickboard press (blue, red)              Row/ext w/ tubing (red, blue, blk)           Seated on bench             ankle DF/PF       1           ABD/ADD   1           Knee flexion/extension   2         DW TX - hang in the deep water  3' 5           DW ABD/ADD   1           DW Bicycle  4' 5           DW Scissor hip flexion/extension   1           DW DKTC   1         Stretches              HS at step   2           Wall stretches              Calf stretch at wall              Other: knee aguila on step  10x ea         Hot Tub - 10 minutes only   10            4'

## 2024-12-02 ENCOUNTER — OFFICE VISIT (OUTPATIENT)
Dept: PHYSICAL THERAPY | Age: 49
End: 2024-12-02
Payer: COMMERCIAL

## 2024-12-02 DIAGNOSIS — M17.0 BILATERAL PRIMARY OSTEOARTHRITIS OF KNEE: Primary | ICD-10-CM

## 2024-12-02 PROCEDURE — 97113 AQUATIC THERAPY/EXERCISES: CPT

## 2024-12-02 NOTE — PROGRESS NOTES
Daily Note     Today's date: 2024  Patient name: Jaida Crockett  : 1975  MRN: [id number]  Referring provider: Karolina Rachel PA-C  Dx:   Encounter Diagnosis     ICD-10-CM    1. Bilateral primary osteoarthritis of knee  M17.0           Start Time: 0900  Stop Time: 1000  Total time in clinic (min): 60 minutes    Subjective: pt notes stiffness in B knees but L >R. She was standing a lot this week.       Objective: See treatment diary below      Assessment: Tolerated treatment well.  Continued w/ ex's as per flowsheet. HEP given to pt including LE stretches. Pt was shown and demonstrated calf, HS and quad stretches using a strap.  Patient would benefit from continued PT      Plan: Continue per plan of care.        POC expires Unit limit Auth Expiration date PT/OT + Visit Limit?   25 4 NA BOMN                           Visit/Unit Tracking  AUTH Status:  Date             No AUTH/ $20 copay Used 1 2 3             Remaining                 FOTO done                    Precautions: rheumatic disease, connective tissue disorder    Daily Treatment Diary     Date  12/2        Patient education 6'          Water Walk (FW, BW, side) x10’  5'5' 10' 10        LE work at wall               Hip FF/ext swing  2 2 2          Toe/heel  1 1 1          Hip ABD/ADD  2 2 2          March 1 1 1          Knee flexion & extension 1 1 1          Squats  1 1        UE noodle x3             Push/pull              Rotate              Row forward & back              OH side bend            UE/Core (AROM, paddles, MB)              ABD/ADD              Horizontal Pec Fly              Alt. arm swing (shld flex/ext)              Push pull              Single paddle rotation           SLS (EO, EC, ball toss)            Aqua Step (FW, lat, eccentric)            Core work:              MF press (red, blue, blk)             Kickboard press (blue, red)              Row/ext w/ tubing (red, blue, blk)            Seated on bench             ankle DF/PF   1 1 March   1 1          ABD/ADD   1 1          Knee flexion/extension   2 2        DW TX - hang in the deep water  3' 5 5          DW ABD/ADD   1 2          DW Bicycle  4' 5 6          DW Scissor hip flexion/extension   1           DW DKTC   1 1        Stretches              HS at step   2 2          Wall stretches              Calf stretch at wall    10''x3ea          Other: knee aguila on step  10x ea x10ea        Hot Tub - 10 minutes only   10 10           4'

## 2024-12-06 ENCOUNTER — APPOINTMENT (OUTPATIENT)
Dept: PHYSICAL THERAPY | Age: 49
End: 2024-12-06
Payer: COMMERCIAL

## 2024-12-09 ENCOUNTER — OFFICE VISIT (OUTPATIENT)
Dept: PHYSICAL THERAPY | Age: 49
End: 2024-12-09
Payer: COMMERCIAL

## 2024-12-09 DIAGNOSIS — M17.0 BILATERAL PRIMARY OSTEOARTHRITIS OF KNEE: Primary | ICD-10-CM

## 2024-12-09 PROCEDURE — 97150 GROUP THERAPEUTIC PROCEDURES: CPT

## 2024-12-09 PROCEDURE — 97113 AQUATIC THERAPY/EXERCISES: CPT

## 2024-12-09 NOTE — PROGRESS NOTES
Daily Note     Today's date: 2024  Patient name: Jaida Crockett  : 1975  MRN: [id number]  Referring provider: Karolina Rachel PA-C  Dx:   Encounter Diagnosis     ICD-10-CM    1. Bilateral primary osteoarthritis of knee  M17.0           Start Time: 0830  Stop Time: 0930  Total time in clinic (min): 60 minutes    Subjective: pt notes L knee feels weak w/ steps.       Objective: See treatment diary below  Pt seen 1:1 for 40 min and group therapy for remainder    Assessment: Tolerated treatment well.  Challenged pt w/ new ex's for quad strengthening. Added step ups to ea leg in forward and hip flex/ext swings in deep water. Pt given ex's for HEP today including SLR, SLR w/ ER, hip add ball squeeze, LAQ w/ ball squeeze and clamshells. Pt given a printout of ex's and will monitor next visit. Pt had a good understanding of all ex's to do at home. No c/o w/ new ex's in the water today. Progress as tolerated.  Patient would benefit from continued PT      Plan: Continue per plan of care.        POC expires Unit limit Auth Expiration date PT/OT + Visit Limit?   25 4 NA BOMN                           Visit/Unit Tracking  AUTH Status:  Date            No AUTH/ $20 copay Used 1 2 3 4            Remaining                 FOTO done                    Precautions: rheumatic disease, connective tissue disorder    Daily Treatment Diary     Date        Patient education 6'          Water Walk (FW, BW, side) x10’  5'5' 10' 10 10       LE work at wall               Hip FF/ext swing  2 2 2 2         Toe/heel  1 1 1 1         Hip ABD/ADD  2 2 2 2         March 1 1 1 1         Knee flexion & extension 1 1 1 1         Squats  1 1 1       UE noodle x3             Push/pull              Rotate              Row forward & back              OH side bend            UE/Core (AROM, paddles, MB)              ABD/ADD              Horizontal Pec Fly              Alt. arm swing (shld flex/ext)               Push pull              Single paddle rotation           SLS (EO, EC, ball toss)            Aqua Step (FW, lat, eccentric)     2' for       Core work:              MF press (red, blue, blk)             Kickboard press (blue, red)              Row/ext w/ tubing (red, blue, blk)           Seated on bench             ankle DF/PF   1 1 1         March   1 1 1         ABD/ADD   1 1 1         Knee flexion/extension   2 2 4       DW TX - hang in the deep water  3' 5 5 5         DW ABD/ADD   1 2 2         DW Bicycle  4' 5 6 7         DW Scissor hip flexion/extension   1  1         DW DKTC   1 1 1       Stretches              HS at step   2 2 2         Wall stretches              Calf stretch at wall    10''x3ea          Other: knee aguila on step  10x ea x10ea x12ea       Hot Tub - 10 minutes only   10 10 5          4'

## 2024-12-12 ENCOUNTER — APPOINTMENT (OUTPATIENT)
Dept: PHYSICAL THERAPY | Age: 49
End: 2024-12-12
Payer: COMMERCIAL

## 2024-12-17 ENCOUNTER — APPOINTMENT (OUTPATIENT)
Dept: PHYSICAL THERAPY | Age: 49
End: 2024-12-17
Payer: COMMERCIAL

## 2024-12-19 ENCOUNTER — OFFICE VISIT (OUTPATIENT)
Dept: PHYSICAL THERAPY | Age: 49
End: 2024-12-19
Payer: COMMERCIAL

## 2024-12-19 DIAGNOSIS — M17.0 BILATERAL PRIMARY OSTEOARTHRITIS OF KNEE: Primary | ICD-10-CM

## 2024-12-19 PROCEDURE — 97113 AQUATIC THERAPY/EXERCISES: CPT

## 2024-12-19 NOTE — PROGRESS NOTES
Daily Note     Today's date: 2024  Patient name: Jaida Crockett  : 1975  MRN: [id number]  Referring provider: Karolina Rachel PA-C  Dx:   Encounter Diagnosis     ICD-10-CM    1. Bilateral primary osteoarthritis of knee  M17.0           Start Time: 0800  Stop Time: 0900  Total time in clinic (min): 60 minutes    Subjective: Patient reports that she is having increased pain and stiffness overall this week, she notices a difference from not being here over a week,.       Objective: See treatment diary below      Assessment: Tolerated treatment fairly well, stiff and guarded movements at times. No c/o increased pain. Patient demonstrated fatigue post treatment and would benefit from continued PT      Plan: Continue per plan of care.        POC expires Unit limit Auth Expiration date PT/OT + Visit Limit?   25 4 NA BOMN                           Visit/Unit Tracking  AUTH Status:  Date           No AUTH/ $20 copay Used 1 2 3 4 5           Remaining                 FOTO done                    Precautions: rheumatic disease, connective tissue disorder    Daily Treatment Diary     Date       Patient education 6'          Water Walk (FW, BW, side) x10’  5'5' 10' 10 10 10      LE work at wall               Hip FF/ext swing  2 2 2 2 2        Toe/heel  1 1 1 1 1        Hip ABD/ADD  2 2 2 2 2         1 1 1 1        Knee flexion & extension 1 1 1 1 1        Squats  1 1 1 1      UE noodle x3             Push/pull              Rotate              Row forward & back              OH side bend            UE/Core (AROM, paddles, MB)              ABD/ADD              Horizontal Pec Fly              Alt. arm swing (shld flex/ext)              Push pull              Single paddle rotation           SLS (EO, EC, ball toss)            Aqua Step (FW, lat, eccentric)     2' for 2' F      Core work:              MF press (red, blue, blk)             Kickboard press  (blue, red)              Row/ext w/ tubing (red, blue, blk)           Seated on bench             ankle DF/PF   1 1 1 1        March   1 1 1 1        ABD/ADD   1 1 1 1        Knee flexion/extension   2 2 4 4'      DW TX - hang in the deep water  3' 5 5 5 5        DW ABD/ADD   1 2 2 2        DW Bicycle  4' 5 6 7 7        DW Scissor hip flexion/extension   1  1 1        DW DKTC   1 1 1 1      Stretches              HS at step   2 2 2 2        Wall stretches              Calf stretch at wall    10''x3ea          Other: knee aguila on step  10x ea x10ea x12ea 12x ea      Hot Tub - 10 minutes only   10 10 5 5         4'

## 2024-12-23 ENCOUNTER — OFFICE VISIT (OUTPATIENT)
Dept: PHYSICAL THERAPY | Age: 49
End: 2024-12-23
Payer: COMMERCIAL

## 2024-12-23 DIAGNOSIS — M17.0 BILATERAL PRIMARY OSTEOARTHRITIS OF KNEE: Primary | ICD-10-CM

## 2024-12-23 PROCEDURE — 97113 AQUATIC THERAPY/EXERCISES: CPT

## 2024-12-23 NOTE — PROGRESS NOTES
Daily Note     Today's date: 2024  Patient name: Jaida Crockett  : 1975  MRN: [id number]  Referring provider: Karolina Rachel PA-C  Dx:   Encounter Diagnosis     ICD-10-CM    1. Bilateral primary osteoarthritis of knee  M17.0           Start Time: 0800  Stop Time: 0900  Total time in clinic (min): 60 minutes    Subjective: Patient offers no new complaint today.       Objective: See treatment diary below      Assessment: Tolerated treatment fairly well with program today. No c/o pain in the pool today.  Patient demonstrated fatigue post treatment and would benefit from continued PT    Patient seen 1:1 for 30 minutes and rest of time group setting.      Plan: Continue per plan of care.        POC expires Unit limit Auth Expiration date PT/OT + Visit Limit?   25 4 NA BOMN                           Visit/Unit Tracking  AUTH Status:  Date          No AUTH/ $20 copay Used 1 2 3 4 5 6          Remaining                 FOTO done                    Precautions: rheumatic disease, connective tissue disorder    Daily Treatment Diary     Date      Patient education 6'          Water Walk (FW, BW, side) x10’  5'5' 10' 10 10 10 10     LE work at wall               Hip FF/ext swing  2 2 2 2 2 2       Toe/heel  1 1 1 1 1 1       Hip ABD/ADD  2 2 2 2 2 2        1 1 1 1 1       Knee flexion & extension 1 1 1 1 1 1       Squats  1 1 1 1 1     UE noodle x3             Push/pull              Rotate              Row forward & back              OH side bend            UE/Core (AROM, paddles, MB)              ABD/ADD              Horizontal Pec Fly              Alt. arm swing (shld flex/ext)              Push pull              Single paddle rotation           SLS (EO, EC, ball toss)            Aqua Step (FW, lat, eccentric)     2' for 2' F 2' F     Core work:              MF press (red, blue, blk)             Kickboard press (blue, red)               Row/ext w/ tubing (red, blue, blk)           Seated on bench             ankle DF/PF   1 1 1 1 1       March   1 1 1 1 1       ABD/ADD   1 1 1 1 1       Knee flexion/extension   2 2 4 4' 4'     DW TX - hang in the deep water  3' 5 5 5 5 5       DW ABD/ADD   1 2 2 2 2       DW Bicycle  4' 5 6 7 7 6'       DW Scissor hip flexion/extension   1  1 1 1       DW DKTC   1 1 1 1 1     Stretches              HS at step   2 2 2 2 2       Wall stretches              Calf stretch at wall    10''x3ea          Other: knee aguila on step  10x ea x10ea x12ea 12x ea 10x ea     Hot Tub - 10 minutes only   10 10 5 5 8        4'

## 2024-12-27 ENCOUNTER — APPOINTMENT (OUTPATIENT)
Dept: PHYSICAL THERAPY | Age: 49
End: 2024-12-27
Payer: COMMERCIAL

## 2024-12-31 ENCOUNTER — APPOINTMENT (OUTPATIENT)
Dept: PHYSICAL THERAPY | Age: 49
End: 2024-12-31
Payer: COMMERCIAL

## 2025-01-03 ENCOUNTER — APPOINTMENT (OUTPATIENT)
Dept: PHYSICAL THERAPY | Age: 50
End: 2025-01-03
Payer: COMMERCIAL

## 2025-01-06 ENCOUNTER — OFFICE VISIT (OUTPATIENT)
Dept: PHYSICAL THERAPY | Age: 50
End: 2025-01-06
Payer: COMMERCIAL

## 2025-01-06 DIAGNOSIS — M17.0 BILATERAL PRIMARY OSTEOARTHRITIS OF KNEE: Primary | ICD-10-CM

## 2025-01-06 PROCEDURE — 97113 AQUATIC THERAPY/EXERCISES: CPT

## 2025-01-06 NOTE — PROGRESS NOTES
Daily Note     Today's date: 2025  Patient name: Jaida Crockett  : 1975  MRN: [id number]  Referring provider: Karolina Rachel PA-C  Dx:   Encounter Diagnosis     ICD-10-CM    1. Bilateral primary osteoarthritis of knee  M17.0           Start Time: 1100  Stop Time: 1200  Total time in clinic (min): 60 minutes    Subjective: Patient reports that she is 1-2/10 B knee pain       Objective: See treatment diary below      Assessment: Tolerated treatment fairly well with program outlined below. No c/o pain with TE, occasional cue for ex technique.  Patient exhibited good technique with therapeutic exercises and would benefit from continued PT      Plan: Continue per plan of care.        POC expires Unit limit Auth Expiration date PT/OT + Visit Limit?   25 4 NA BOMN                           Visit/Unit Tracking  AUTH Status:  Date         No AUTH/ $20 copay Used 1 2 3 4 5 6 7         Remaining                 FOTO done                    Precautions: rheumatic disease, connective tissue disorder    Daily Treatment Diary     Date     Patient education 6'          Water Walk (FW, BW, side) x10’  5'5' 10' 10 10 10 10 10    LE work at wall               Hip FF/ext swing  2 2 2 2 2 2 2      Toe/heel  1 1 1 1 1 1 1      Hip ABD/ADD  2 2 2 2 2 2 2      March 1 1 1 1 1 1 1      Knee flexion & extension 1 1 1 1 1 1 1      Squats  1 1 1 1 1 1    UE noodle x3             Push/pull              Rotate              Row forward & back              OH side bend            UE/Core (AROM, paddles, MB)              ABD/ADD              Horizontal Pec Fly              Alt. arm swing (shld flex/ext)              Push pull              Single paddle rotation           SLS (EO, EC, ball toss)            Aqua Step (FW, lat, eccentric)     2' for 2' F 2' F 2' F    Core work:              MF press (red, blue, blk)             Kickboard press (blue, red)               Row/ext w/ tubing (red, blue, blk)           Seated on bench             ankle DF/PF   1 1 1 1 1 1      March   1 1 1 1 1 D/c      ABD/ADD   1 1 1 1 1 D/c      Knee flexion/extension   2 2 4 4' 4' 4'    DW TX - hang in the deep water  3' 5 5 5 5 5 5      DW ABD/ADD   1 2 2 2 2 2      DW Bicycle  4' 5 6 7 7 6' 6      DW Scissor hip flexion/extension   1  1 1 1 1      DW DKTC   1 1 1 1 1 1    Stretches              HS at step   2 2 2 2 2 2      Wall stretches              Calf stretch at wall    10''x3ea          Other: knee aguila on step  10x ea x10ea x12ea 12x ea 10x ea 10x ea    Hot Tub - 10 minutes only   10 10 5 5 8 5'       4'

## 2025-01-10 ENCOUNTER — OFFICE VISIT (OUTPATIENT)
Dept: PHYSICAL THERAPY | Age: 50
End: 2025-01-10
Payer: COMMERCIAL

## 2025-01-10 DIAGNOSIS — M17.0 BILATERAL PRIMARY OSTEOARTHRITIS OF KNEE: Primary | ICD-10-CM

## 2025-01-10 PROCEDURE — 97113 AQUATIC THERAPY/EXERCISES: CPT

## 2025-01-10 NOTE — PROGRESS NOTES
Daily Note     Today's date: 1/10/2025  Patient name: Jaida Crockett  : 1975  MRN: [id number]  Referring provider: Karolina Rachel PA-C  Dx:   Encounter Diagnosis     ICD-10-CM    1. Bilateral primary osteoarthritis of knee  M17.0           Start Time: 0800  Stop Time: 0900  Total time in clinic (min): 60 minutes    Subjective: patient reports she has difficulty with going down stairs but it is getting better c/o 1-210 B knee pain.       Objective: See treatment diary below      Assessment: Tolerated treatment well with program outlined below, added step overs with focus on ecc control. Patient exhibited good technique with therapeutic exercises and would benefit from continued PT      Plan: Continue per plan of care.        POC expires Unit limit Auth Expiration date PT/OT + Visit Limit?   25 4 NA BOMN                           Visit/Unit Tracking  AUTH Status:  Date 11/22 11/25 12/2 12/9 12/19 12/23 1/6 1/10       No AUTH/ $20 copay Used 1 2 3 4 5 6 7 8        Remaining                 FOTO done                    Precautions: rheumatic disease, connective tissue disorder    Daily Treatment Diary     Date 11/22 11/25 12/2 12/9 12/19 12/23 1/6 1/10   Patient education 6'          Water Walk (FW, BW, side) x10’  5'5' 10' 10 10 10 10 10 10   LE work at wall               Hip FF/ext swing  2 2 2 2 2 2 2 2     Toe/heel  1 1 1 1 1 1 1 1     Hip ABD/ADD  2 2 2 2 2 2 2 2     March 1 1 1 1 1 1 1 1     Knee flexion & extension 1 1 1 1 1 1 1 1     Squats  1 1 1 1 1 1 1   UE noodle x3             Push/pull              Rotate              Row forward & back              OH side bend            UE/Core (AROM, paddles, MB)              ABD/ADD              Horizontal Pec Fly              Alt. arm swing (shld flex/ext)              Push pull              Single paddle rotation           SLS (EO, EC, ball toss)            Aqua Step (FW, lat, eccentric)     2' for 2' F 2' F 2' F 2' F   Step overs         2'      MF  press (red, blue, blk)             Kickboard press (blue, red)              Row/ext w/ tubing (red, blue, blk)           Seated on bench             ankle DF/PF   1 1 1 1 1 1      March   1 1 1 1 1 D/c      ABD/ADD   1 1 1 1 1 D/c      Knee flexion/extension   2 2 4 4' 4' 4' 4'   DW TX - hang in the deep water  3' 5 5 5 5 5 5 5     DW ABD/ADD   1 2 2 2 2 2 2     DW Bicycle  4' 5 6 7 7 6' 6 8     DW Scissor hip flexion/extension   1  1 1 1 1 2     DW DKTC   1 1 1 1 1 1 1   Stretches              HS at step   2 2 2 2 2 2 2     Wall stretches              Calf stretch at wall    10''x3ea          Other: knee aguila on step  10x ea x10ea x12ea 12x ea 10x ea 10x ea 8x ea   Hot Tub - 10 minutes only   10 10 5 5 8 5' 5'      4'

## 2025-01-15 ENCOUNTER — OFFICE VISIT (OUTPATIENT)
Dept: PHYSICAL THERAPY | Age: 50
End: 2025-01-15
Payer: COMMERCIAL

## 2025-01-15 DIAGNOSIS — M17.0 BILATERAL PRIMARY OSTEOARTHRITIS OF KNEE: Primary | ICD-10-CM

## 2025-01-15 PROCEDURE — 97113 AQUATIC THERAPY/EXERCISES: CPT

## 2025-01-15 NOTE — PROGRESS NOTES
Daily Note     Today's date: 1/15/2025  Patient name: Jaida Crockett  : 1975  MRN: [id number]  Referring provider: Karolina Rachel PA-C  Dx:   Encounter Diagnosis     ICD-10-CM    1. Bilateral primary osteoarthritis of knee  M17.0           Start Time: 0805  Stop Time: 0900  Total time in clinic (min): 55 minutes    Subjective: pt notes B knee pain is much better, /10 this morning. She c/o increased pain w/ prolonged walking. Today she notes the last 2 weeks she has had L LBP making it difficult to turn in bed. She also notes a lot of stress lately w/ family as her mom recently just had a stroke and is still in the hospital so she has been doing a lot of extra driving.       Objective: See treatment diary below      Assessment: Tolerated treatment fair. Showed pt a piriformis stretch and KTC for HEP to help reduce back pain. Some difficulty performing the stretches herself due to tissue approximation. No complaints w/ knee ex's today. Improved knee ROM since starting PT. Pt to continue one more visit than d/c to fitness program due to copay. Pt given all information regarding fitness program today. Pt understands the importance of continuing w/ her ex's to maintain progress. Patient would benefit from continued PT      Plan: Continue per plan of care.        POC expires Unit limit Auth Expiration date PT/OT + Visit Limit?   25 4 NA BOMN                           Visit/Unit Tracking  AUTH Status:  Date 11/22 11/25 12/2 12/9 12/19 12/23 1/6 1/10 1/15      No AUTH/ $20 copay Used 1 2 3 4 5 6 7 8 9       Remaining                 FOTO done                    Precautions: rheumatic disease, connective tissue disorder    Daily Treatment Diary     Date 1/15  12/2 12/9 12/19 12/23 1/6 1/10   Patient education           Water Walk (FW, BW, side) x10’  10 10' 10 10 10 10 10 10   LE work at wall               Hip FF/ext swing  2 2 2 2 2 2 2 2     Toe/heel  1 1 1 1 1 1 1 1     Hip ABD/ADD  2 2 2 2 2 2 2 2      March 1 1 1 1 1 1 1 1     Knee flexion & extension 1 1 1 1 1 1 1 1     Squats 1 1 1 1 1 1 1 1   UE noodle x3             Push/pull              Rotate              Row forward & back              OH side bend            UE/Core (AROM, paddles, MB)              ABD/ADD              Horizontal Pec Fly              Alt. arm swing (shld flex/ext)              Push pull              Single paddle rotation           SLS (EO, EC, ball toss)            Aqua Step (FW, lat, eccentric)  2   2' for 2' F 2' F 2' F 2' F   Step overs  2       2'      MF press (red, blue, blk)             Kickboard press (blue, red)              Row/ext w/ tubing (red, blue, blk)           Seated on bench             ankle DF/PF   1 1 1 1 1 1      March   1 1 1 1 1 D/c      ABD/ADD   1 1 1 1 1 D/c      Knee flexion/extension   2 2 4 4' 4' 4' 4'   DW TX - hang in the deep water  8 5 5 5 5 5 5 5     DW ABD/ADD  nt 1 2 2 2 2 2 2     DW Bicycle  8 5 6 7 7 6' 6 8     DW Scissor hip flexion/extension   1  1 1 1 1 2     DW DKTC  1 1 1 1 1 1 1 1   Stretches              HS at step  2 2 2 2 2 2 2 2   Pt ed/HEP 5'            Calf stretch at wall    10''x3ea          Other: knee aguila on step x10ea 10x ea x10ea x12ea 12x ea 10x ea 10x ea 8x ea   Hot Tub - 10 minutes only  5 10 10 5 5 8 5' 5'      4'

## 2025-01-17 ENCOUNTER — APPOINTMENT (OUTPATIENT)
Dept: PHYSICAL THERAPY | Age: 50
End: 2025-01-17
Payer: COMMERCIAL

## 2025-05-21 ENCOUNTER — TELEPHONE (OUTPATIENT)
Age: 50
End: 2025-05-21

## 2025-05-21 DIAGNOSIS — M17.0 BILATERAL PRIMARY OSTEOARTHRITIS OF KNEE: Primary | ICD-10-CM

## 2025-05-21 NOTE — TELEPHONE ENCOUNTER
Caller: Jaida  Doctor/office: Dr Walker   #: [phone]      Jaida called to start auth/delivery for VISCO(Gel) injections.    R/L/Bilateral knee: Bilateral Knee   Pain Level (scale 1-10): 5  Date of last Gel Injection: 11/15/2024  Did the last injection work well for you? yes  Have you taken                   NSAIDS (Ibuprofen, Meloxicam, Naproxen)? yes                   Tylenol? yes  Have you done Home exercises/Physical Therapy? yes  Have you had a cortisone/steroid injection? yes      Educated patient on Visco procedure. Auth team will review insurance and process the request appropriately. Patient will be contacted if the have any questions and when ready to schedule.

## 2025-06-03 ENCOUNTER — DOCUMENTATION (OUTPATIENT)
Dept: OBGYN CLINIC | Facility: CLINIC | Age: 50
End: 2025-06-03

## 2025-06-05 ENCOUNTER — DOCUMENTATION (OUTPATIENT)
Dept: OBGYN CLINIC | Facility: CLINIC | Age: 50
End: 2025-06-05

## 2025-06-18 ENCOUNTER — PROCEDURE VISIT (OUTPATIENT)
Dept: OBGYN CLINIC | Facility: CLINIC | Age: 50
End: 2025-06-18
Payer: COMMERCIAL

## 2025-06-18 VITALS — HEIGHT: 64 IN | BODY MASS INDEX: 54.24 KG/M2

## 2025-06-18 DIAGNOSIS — M17.0 BILATERAL PRIMARY OSTEOARTHRITIS OF KNEE: Primary | ICD-10-CM

## 2025-06-18 PROCEDURE — 20610 DRAIN/INJ JOINT/BURSA W/O US: CPT | Performed by: PHYSICIAN ASSISTANT

## 2025-06-18 NOTE — PROGRESS NOTES
Patient has reported improvements from previous visco injections. Pain is rated at 5/10.  After discussing the options for treatment, the patient elected to proceed forward with Bilateral knee Monovisc injections to reduce pain. Risks of injection, including but not limited to, post-injection pain, swelling, pseudo septic reaction, skin rash, itching, and infection were discussed in detail.  The patient understood, had no further questions and elected to proceed forward.  After sterile preparation, the Monovisc injections were injected into the Bilateral knees, respectively.  The patient tolerated the procedure well no complications were noted.  The patient was instructed ice and elevate the extremity, limit strenuous activity for the next 2-3 days, and to contact us if there were any questions or concerns prior to their follow-up appointment.  I will see the patient back 3 months or as needed.        Large joint arthrocentesis: R knee    Performed by: Karolina Rachel PA-C  Authorized by: Karolina Rachel PA-C    Universal Protocol:  Risks and benefits: risks, benefits and alternatives were discussed  Consent given by: patient  Patient identity confirmed: verbally with patient  Supporting Documentation  Indications: pain     Is this a Visco injection? Yes  Non-Pharmacologic Treatments Attempted: Home Exercise  Pharmacologic Treatments Attempted: otc  Pain Score: 5Procedure Details  Location: knee - R knee  Needle size: 22 G  Medications administered: 88 mg hyaluronan 88 MG/4ML    Patient tolerance: patient tolerated the procedure well with no immediate complications  Dressing:  Sterile dressing applied      Large joint arthrocentesis: L knee    Performed by: Karolina Rachel PA-C  Authorized by: Karolina Rachel PA-C    Universal Protocol:  Risks and benefits: risks, benefits and alternatives were discussed  Consent given by: patient  Patient identity confirmed: verbally with patient  Supporting  Documentation  Indications: pain     Is this a Visco injection? Yes  Non-Pharmacologic Treatments Attempted: Home Exercise  Pharmacologic Treatments Attempted: otc  Pain Score: 5Procedure Details  Location: knee - L knee  Needle size: 22 G  Approach: lateral  Medications administered: 88 mg hyaluronan 88 MG/4ML    Patient tolerance: patient tolerated the procedure well with no immediate complications  Dressing:  Sterile dressing applied